# Patient Record
Sex: MALE | Race: WHITE | NOT HISPANIC OR LATINO | Employment: FULL TIME | URBAN - NONMETROPOLITAN AREA
[De-identification: names, ages, dates, MRNs, and addresses within clinical notes are randomized per-mention and may not be internally consistent; named-entity substitution may affect disease eponyms.]

---

## 2018-01-25 ENCOUNTER — CONSULT (OUTPATIENT)
Dept: GASTROENTEROLOGY | Facility: CLINIC | Age: 48
End: 2018-01-25

## 2018-01-25 VITALS
WEIGHT: 222.8 LBS | SYSTOLIC BLOOD PRESSURE: 121 MMHG | DIASTOLIC BLOOD PRESSURE: 79 MMHG | OXYGEN SATURATION: 97 % | BODY MASS INDEX: 33 KG/M2 | HEART RATE: 77 BPM | HEIGHT: 69 IN

## 2018-01-25 DIAGNOSIS — R12 HEARTBURN: ICD-10-CM

## 2018-01-25 DIAGNOSIS — R11.0 NAUSEA: ICD-10-CM

## 2018-01-25 DIAGNOSIS — R14.3 FLATULENCE: ICD-10-CM

## 2018-01-25 DIAGNOSIS — R10.30 LOWER ABDOMINAL PAIN: ICD-10-CM

## 2018-01-25 DIAGNOSIS — K21.9 GASTROESOPHAGEAL REFLUX DISEASE, ESOPHAGITIS PRESENCE NOT SPECIFIED: ICD-10-CM

## 2018-01-25 DIAGNOSIS — R14.0 ABDOMINAL DISTENTION: ICD-10-CM

## 2018-01-25 DIAGNOSIS — K62.5 ANAL BLEEDING: Primary | ICD-10-CM

## 2018-01-25 DIAGNOSIS — K59.00 CONSTIPATION, UNSPECIFIED CONSTIPATION TYPE: ICD-10-CM

## 2018-01-25 DIAGNOSIS — K64.9 HEMORRHOIDS, UNSPECIFIED HEMORRHOID TYPE: ICD-10-CM

## 2018-01-25 PROCEDURE — 99244 OFF/OP CNSLTJ NEW/EST MOD 40: CPT | Performed by: PHYSICIAN ASSISTANT

## 2018-01-25 RX ORDER — LISINOPRIL 10 MG/1
20 TABLET ORAL DAILY
COMMUNITY
End: 2021-09-23 | Stop reason: SDUPTHER

## 2018-01-25 RX ORDER — BUPROPION HYDROCHLORIDE 100 MG/1
100 TABLET, EXTENDED RELEASE ORAL 2 TIMES DAILY
COMMUNITY
End: 2021-09-23

## 2018-01-25 NOTE — PROGRESS NOTES
Chief Complaint   Patient presents with   • Abdominal Pain   • Constipation   • Nausea     Sterling Brady is a 47 y.o. male who presents to the office today at the request of JOSE Joyce for Abdominal Pain; Constipation; and Nausea.    HPI    The patient was seen for a GI evaluation due to lower abdominal pain/cramping, constipation, anal bleeding, and nausea that has been going on for the past two years.  Patient states that he also has increased flatulence and abdominal distention due to his constipation.  He has tried Metamucil, Miralax, over the counter stool softeners and prescription stool softeners in the past.  He takes Goody's powder for headaches.  He reports that he drinks 4-5 bottles of water per day and 2-3 caffeinated drinks per day.  Patient denies vomiting, diarrhea, and melena.  He reports a long history of hemorrhoids. The patient has never had a colonoscopy.  He had an EGD at age 18 and was placed on Zantac.  Patient has significant acid reflux and takes Zantac as needed which doesn't completely control it.  He still has his gallbladder.  Patient is unsure of family history.   Medical, surgical, and social histories were reviewed and are listed below.    Review of Systems   Constitutional: Positive for fatigue. Negative for chills and fever.   HENT: Positive for congestion. Negative for trouble swallowing.    Eyes: Positive for pain and visual disturbance.   Respiratory: Positive for shortness of breath. Negative for cough and wheezing.    Cardiovascular: Negative for chest pain.   Gastrointestinal: Positive for abdominal distention, anal bleeding, constipation and nausea. Negative for abdominal pain, blood in stool, diarrhea, rectal pain and vomiting.   Genitourinary: Negative for difficulty urinating.   Musculoskeletal: Positive for arthralgias, back pain and myalgias.   Skin: Negative for rash and wound.   Allergic/Immunologic: Positive for environmental allergies. Negative for food  "allergies.   Neurological: Negative for dizziness and headaches.   Hematological: Does not bruise/bleed easily.   Psychiatric/Behavioral: Positive for sleep disturbance. The patient is nervous/anxious.        ACTIVE PROBLEMS:   Specialty Problems     None          PAST MEDICAL HISTORY:  Past Medical History:   Diagnosis Date   • Hypertension        SURGICAL HISTORY:  History reviewed. No pertinent surgical history.    FAMILY HISTORY:  Family History   Problem Relation Age of Onset   • Hypertension Mother    • Heart disease Mother    • Diabetes Mother        SOCIAL HISTORY:  Social History   Substance Use Topics   • Smoking status: Current Some Day Smoker   • Smokeless tobacco: Never Used   • Alcohol use Yes       CURRENT MEDICATION:    Current Outpatient Prescriptions:   •  Aspirin-Acetaminophen-Caffeine (GOODY HEADACHE PO), Take  by mouth., Disp: , Rfl:   •  buPROPion SR (WELLBUTRIN SR) 100 MG 12 hr tablet, Take 100 mg by mouth 2 (Two) Times a Day., Disp: , Rfl:   •  lisinopril (PRINIVIL,ZESTRIL) 10 MG tablet, Take 10 mg by mouth Daily., Disp: , Rfl:   •  linaclotide (LINZESS) 72 MCG capsule capsule, Take 1 capsule by mouth Daily. Take 1 tablet once daily on an empty stomach at least 30 minutes prior to the first meal of the day., Disp: 30 capsule, Rfl: 5  •  polyethylene glycol (GoLYTELY) 236 g solution, Starting at 6 pm on day prior to procedure, drink 8 ounces every 15 minutes until all gone or stools are clear. May add flavor packet., Disp: 4000 mL, Rfl: 0    ALLERGIES:  Review of patient's allergies indicates no known allergies.    VISIT VITALS:  /79  Pulse 77  Ht 175.3 cm (69\")  Wt 101 kg (222 lb 12.8 oz)  SpO2 97%  BMI 32.9 kg/m2    PHYSICAL EXAMINATION:  Physical Exam   Constitutional: He is oriented to person, place, and time. He appears well-developed and well-nourished. No distress.   HENT:   Head: Normocephalic and atraumatic.   Right Ear: External ear normal.   Left Ear: External ear normal. "   Nose: Nose normal.   Mouth/Throat: Oropharynx is clear and moist. No oropharyngeal exudate.   Eyes: Conjunctivae and EOM are normal. Pupils are equal, round, and reactive to light. Right eye exhibits no discharge. Left eye exhibits no discharge. No scleral icterus.   Neck: Normal range of motion. Neck supple. No JVD present. No tracheal deviation present. No thyromegaly present.   Cardiovascular: Normal rate, regular rhythm, normal heart sounds and intact distal pulses.  Exam reveals no gallop and no friction rub.    No murmur heard.  Pulmonary/Chest: Effort normal and breath sounds normal. No stridor. No respiratory distress. He has no wheezes. He has no rales. He exhibits no tenderness.   Abdominal: Soft. Bowel sounds are normal. He exhibits no distension and no mass. There is no tenderness. There is no rebound and no guarding. No hernia.   Genitourinary: Rectal exam shows guaiac negative stool.   Lymphadenopathy:     He has no cervical adenopathy.   Neurological: He is alert and oriented to person, place, and time. He has normal reflexes. He displays normal reflexes. No cranial nerve deficit. He exhibits normal muscle tone. Coordination normal.   Skin: Skin is warm and dry. No rash noted. He is not diaphoretic. No erythema. No pallor.   Psychiatric: He has a normal mood and affect. His behavior is normal. Judgment and thought content normal.       Assessment/Plan      Diagnosis Plan   1. Anal bleeding  Case Request   2. Nausea  Case Request   3. Constipation, unspecified constipation type  Case Request   4. Lower abdominal pain  Case Request   5. Hemorrhoids, unspecified hemorrhoid type  Case Request   6. Abdominal distention  Case Request   7. Flatulence  Case Request   8. Heartburn  Case Request   9. Gastroesophageal reflux disease, esophagitis presence not specified  Case Request     The patient will be started on Linzess 72mcg for constipation.  He will also be scheduled for an EGD/colonoscopy due to anal  bleeding uncontrolled acid reflux, and other GI symptoms.  Procedures were discussed with the patient who voiced understanding and agreement.    Return in about 8 weeks (around 3/22/2018) for Recheck.           SUDHA Larson

## 2021-09-23 ENCOUNTER — OFFICE VISIT (OUTPATIENT)
Dept: INTERNAL MEDICINE | Facility: CLINIC | Age: 51
End: 2021-09-23

## 2021-09-23 VITALS
HEART RATE: 83 BPM | TEMPERATURE: 97.9 F | HEIGHT: 69 IN | OXYGEN SATURATION: 98 % | SYSTOLIC BLOOD PRESSURE: 158 MMHG | BODY MASS INDEX: 33.89 KG/M2 | DIASTOLIC BLOOD PRESSURE: 96 MMHG | WEIGHT: 228.8 LBS

## 2021-09-23 DIAGNOSIS — Z91.89 AT RISK FOR SLEEP APNEA: ICD-10-CM

## 2021-09-23 DIAGNOSIS — Z12.11 SCREEN FOR COLON CANCER: ICD-10-CM

## 2021-09-23 DIAGNOSIS — I10 ESSENTIAL HYPERTENSION: Primary | ICD-10-CM

## 2021-09-23 DIAGNOSIS — F41.1 GENERALIZED ANXIETY DISORDER: ICD-10-CM

## 2021-09-23 DIAGNOSIS — M25.50 ARTHRALGIA, UNSPECIFIED JOINT: ICD-10-CM

## 2021-09-23 DIAGNOSIS — K21.9 GASTROESOPHAGEAL REFLUX DISEASE WITHOUT ESOPHAGITIS: ICD-10-CM

## 2021-09-23 DIAGNOSIS — Z00.00 HEALTHCARE MAINTENANCE: ICD-10-CM

## 2021-09-23 DIAGNOSIS — Z12.5 SCREENING FOR PROSTATE CANCER: ICD-10-CM

## 2021-09-23 DIAGNOSIS — Z72.0 TOBACCO ABUSE: ICD-10-CM

## 2021-09-23 PROCEDURE — 99204 OFFICE O/P NEW MOD 45 MIN: CPT | Performed by: NURSE PRACTITIONER

## 2021-09-23 RX ORDER — BUPROPION HYDROCHLORIDE 100 MG/1
100 TABLET, EXTENDED RELEASE ORAL
Status: CANCELLED | OUTPATIENT
Start: 2021-09-23

## 2021-09-23 RX ORDER — NAPROXEN 375 MG/1
375 TABLET, DELAYED RELEASE ORAL DAILY PRN
Qty: 30 EACH | Refills: 3 | Status: SHIPPED | OUTPATIENT
Start: 2021-09-23

## 2021-09-23 RX ORDER — BUPROPION HYDROCHLORIDE 150 MG/1
150 TABLET ORAL EVERY MORNING
Qty: 30 TABLET | Refills: 11 | Status: SHIPPED | OUTPATIENT
Start: 2021-09-23 | End: 2021-09-23 | Stop reason: SDUPTHER

## 2021-09-23 RX ORDER — LISINOPRIL 10 MG/1
20 TABLET ORAL DAILY
Status: CANCELLED | OUTPATIENT
Start: 2021-09-23

## 2021-09-23 RX ORDER — FAMOTIDINE 20 MG/1
20 TABLET, FILM COATED ORAL 2 TIMES DAILY PRN
Qty: 60 TABLET | Refills: 5 | Status: SHIPPED | OUTPATIENT
Start: 2021-09-23 | End: 2022-03-23 | Stop reason: SDUPTHER

## 2021-09-23 RX ORDER — LISINOPRIL 20 MG/1
20 TABLET ORAL DAILY
Qty: 30 TABLET | Refills: 0 | Status: SHIPPED | OUTPATIENT
Start: 2021-09-23 | End: 2021-09-28 | Stop reason: SDUPTHER

## 2021-09-23 RX ORDER — BUPROPION HYDROCHLORIDE 300 MG/1
300 TABLET ORAL EVERY MORNING
Qty: 30 TABLET | Refills: 11 | Status: ON HOLD | OUTPATIENT
Start: 2021-09-23 | End: 2023-04-04

## 2021-09-23 NOTE — PROGRESS NOTES
Chief Complaint   Patient presents with   • Establish Care   • Hypertension   • Nicotine Dependence     wants to quit       History of Present Illness    51 y.o.male presents for establish care, htn, anxiety, tobacco abuse.    htn years; on lisinopril. No headaches, vision changes, chest pain or edema.  Anxiety chronic; takes wellbutrin for this. Would like to increase dose. Also Smoker pack a day. interested in cessation.  Gerd; takes ppi controlled.  Snoring; need sleep study.  Chronic arthralgias    Review of Systems   Constitutional: Negative for chills, fatigue, unexpected weight gain and unexpected weight loss.   HENT: Negative for congestion, postnasal drip and rhinorrhea.    Eyes: Negative for blurred vision and visual disturbance.   Respiratory: Negative for cough and shortness of breath.    Cardiovascular: Negative for chest pain, palpitations and leg swelling.   Gastrointestinal: Positive for GERD. Negative for abdominal pain, blood in stool, constipation, diarrhea, nausea and vomiting.   Genitourinary: Negative for difficulty urinating.   Musculoskeletal: Negative for arthralgias.   Skin: Negative for rash.   Neurological: Negative for dizziness, syncope, light-headedness and headache.   Psychiatric/Behavioral: Negative for depressed mood. The patient is nervous/anxious.          [facility]  The following portions of the patient's history were reviewed and updated as appropriate: allergies, current medications, past family history, past medical history, past social history, past surgical history and problem list.     Past Medical History:   Diagnosis Date   • Hypertension       No Known Allergies   Social History     Tobacco Use   • Smoking status: Current Some Day Smoker   • Smokeless tobacco: Never Used   Vaping Use   • Vaping Use: Never used   Substance Use Topics   • Alcohol use: Yes   • Drug use: Never     Past Surgical History:   Procedure Laterality Date   • WISDOM TOOTH EXTRACTION        Family  "History   Problem Relation Age of Onset   • Hypertension Mother    • Heart disease Mother    • Diabetes Mother    • No Known Problems Father    • No Known Problems Sister    • Alzheimer's disease Maternal Grandmother    • Cancer Paternal Grandfather            Current Outpatient Medications:   •  Aspirin-Acetaminophen-Caffeine (GOODY HEADACHE PO), Take  by mouth., Disp: , Rfl:   •  buPROPion SR (WELLBUTRIN SR) 100 MG 12 hr tablet, Take 100 mg by mouth 2 (Two) Times a Day., Disp: , Rfl:   •  linaclotide (LINZESS) 72 MCG capsule capsule, Take 1 capsule by mouth Daily. Take 1 tablet once daily on an empty stomach at least 30 minutes prior to the first meal of the day. (Patient taking differently: Take 72 mcg by mouth As Needed. Take 1 tablet once daily on an empty stomach at least 30 minutes prior to the first meal of the day.), Disp: 30 capsule, Rfl: 5  •  lisinopril (PRINIVIL,ZESTRIL) 10 MG tablet, Take 20 mg by mouth Daily., Disp: , Rfl:     VITALS:  /96   Pulse 83   Temp 97.9 °F (36.6 °C)   Ht 175.3 cm (69\")   Wt 104 kg (228 lb 12.8 oz)   SpO2 98%   BMI 33.79 kg/m²     Physical Exam  Vitals reviewed.   Constitutional:       General: He is not in acute distress.     Appearance: He is well-developed. He is not ill-appearing or diaphoretic.   HENT:      Head: Normocephalic.      Right Ear: Tympanic membrane, ear canal and external ear normal.      Left Ear: Tympanic membrane, ear canal and external ear normal.      Nose: Nose normal.      Mouth/Throat:      Mouth: Mucous membranes are moist.      Pharynx: Oropharynx is clear. No oropharyngeal exudate or posterior oropharyngeal erythema.   Eyes:      General: Lids are normal.         Right eye: No discharge.         Left eye: No discharge.      Extraocular Movements: Extraocular movements intact.      Conjunctiva/sclera: Conjunctivae normal.      Pupils: Pupils are equal, round, and reactive to light.   Neck:      Thyroid: No thyromegaly.      Vascular: No " JVD.   Cardiovascular:      Rate and Rhythm: Normal rate and regular rhythm.      Pulses: Normal pulses.      Heart sounds: Normal heart sounds.      Comments: No edema  Pulmonary:      Effort: Pulmonary effort is normal. No respiratory distress.      Breath sounds: Normal breath sounds.   Abdominal:      General: Bowel sounds are normal. There is no distension or abdominal bruit.      Palpations: Abdomen is soft. There is no hepatomegaly, splenomegaly or mass.      Tenderness: There is no abdominal tenderness. There is no right CVA tenderness or left CVA tenderness.      Hernia: No hernia is present.   Musculoskeletal:         General: Normal range of motion.      Cervical back: Normal range of motion and neck supple.      Comments: All major joints with normal ROM   Lymphadenopathy:      Head:      Right side of head: No submental, submandibular or tonsillar adenopathy.      Left side of head: No submental, submandibular or tonsillar adenopathy.      Cervical: No cervical adenopathy.   Skin:     General: Skin is warm and dry.      Capillary Refill: Capillary refill takes less than 2 seconds.      Findings: No rash.   Neurological:      General: No focal deficit present.      Mental Status: He is alert and oriented to person, place, and time.      Cranial Nerves: No cranial nerve deficit.      Coordination: Coordination normal.      Gait: Gait normal.   Psychiatric:         Mood and Affect: Mood normal.         Speech: Speech normal.         Behavior: Behavior normal.         Result Review :            Assessment and Plan    Diagnoses and all orders for this visit:    1. Essential hypertension (Primary)  -     MicroAlbumin, Urine, Random - Urine, Clean Catch; Future  -     Comprehensive Metabolic Panel; Future  -     Lipid Panel; Future  -     lisinopril (PRINIVIL,ZESTRIL) 20 MG tablet; Take 1 tablet by mouth Daily.  Dispense: 30 tablet; Refill: 5  Increased lisinopril.  DASH diet reviewed.  2. Screen for colon  cancer  -     Ambulatory Referral For Screening Colonoscopy    3. Healthcare maintenance  -     CBC & Differential; Future  -     Comprehensive Metabolic Panel; Future  -     Hemoglobin A1c; Future    4. Screening for prostate cancer  -     PSA SCREENING; Future    5. At risk for sleep apnea  -     Ambulatory Referral to Sleep Medicine    6. Tobacco abuse  -     buPROPion XL (Wellbutrin XL) 300 MG 24 hr tablet; Take 1 tablet by mouth Every Morning.  Dispense: 30 tablet; Refill: 11    7. Gastroesophageal reflux disease without esophagitis  -     famotidine (Pepcid) 20 MG tablet; Take 1 tablet by mouth 2 (Two) Times a Day As Needed for Heartburn.  Dispense: 60 tablet; Refill: 5    8. Arthralgia, unspecified joint  -     naproxen (EC NAPROSYN) 375 MG tablet delayed-release EC tablet; Take 1 tablet by mouth Daily As Needed for Mild Pain . Take with food  Dispense: 30 each; Refill: 3    9. Generalized anxiety disorder  -     buPROPion XL (Wellbutrin XL) 300 MG 24 hr tablet; Take 1 tablet by mouth Every Morning.  Dispense: 30 tablet; Refill: 11  Though this med is more for depression; he feels like helps his anxiety so will cont. Increased dose since also desiring smoking cessation.        I discussed the patients findings and my recommendations with patient.  Patient was encouraged to keep me informed of any acute changes, lack of improvement, or any new concerning symptoms.  Patient voiced understanding of all instructions and denied further questions.      Follow Up   Return in about 6 months (around 3/23/2022), or if symptoms worsen or fail to improve.      Electronically signed by:    JOSE Sherwood  09/23/2021

## 2021-09-23 NOTE — PATIENT INSTRUCTIONS
"https://www.nhlbi.nih.gov/files/docs/public/heart/dash_brief.pdf\">   DASH Eating Plan  DASH stands for Dietary Approaches to Stop Hypertension. The DASH eating plan is a healthy eating plan that has been shown to:  · Reduce high blood pressure (hypertension).  · Reduce your risk for type 2 diabetes, heart disease, and stroke.  · Help with weight loss.  What are tips for following this plan?  Reading food labels  · Check food labels for the amount of salt (sodium) per serving. Choose foods with less than 5 percent of the Daily Value of sodium. Generally, foods with less than 300 milligrams (mg) of sodium per serving fit into this eating plan.  · To find whole grains, look for the word \"whole\" as the first word in the ingredient list.  Shopping  · Buy products labeled as \"low-sodium\" or \"no salt added.\"  · Buy fresh foods. Avoid canned foods and pre-made or frozen meals.  Cooking  · Avoid adding salt when cooking. Use salt-free seasonings or herbs instead of table salt or sea salt. Check with your health care provider or pharmacist before using salt substitutes.  · Do not hernandez foods. Cook foods using healthy methods such as baking, boiling, grilling, roasting, and broiling instead.  · Cook with heart-healthy oils, such as olive, canola, avocado, soybean, or sunflower oil.  Meal planning    · Eat a balanced diet that includes:  ? 4 or more servings of fruits and 4 or more servings of vegetables each day. Try to fill one-half of your plate with fruits and vegetables.  ? 6-8 servings of whole grains each day.  ? Less than 6 oz (170 g) of lean meat, poultry, or fish each day. A 3-oz (85-g) serving of meat is about the same size as a deck of cards. One egg equals 1 oz (28 g).  ? 2-3 servings of low-fat dairy each day. One serving is 1 cup (237 mL).  ? 1 serving of nuts, seeds, or beans 5 times each week.  ? 2-3 servings of heart-healthy fats. Healthy fats called omega-3 fatty acids are found in foods such as walnuts, " flaxseeds, fortified milks, and eggs. These fats are also found in cold-water fish, such as sardines, salmon, and mackerel.  · Limit how much you eat of:  ? Canned or prepackaged foods.  ? Food that is high in trans fat, such as some fried foods.  ? Food that is high in saturated fat, such as fatty meat.  ? Desserts and other sweets, sugary drinks, and other foods with added sugar.  ? Full-fat dairy products.  · Do not salt foods before eating.  · Do not eat more than 4 egg yolks a week.  · Try to eat at least 2 vegetarian meals a week.  · Eat more home-cooked food and less restaurant, buffet, and fast food.    Lifestyle  · When eating at a restaurant, ask that your food be prepared with less salt or no salt, if possible.  · If you drink alcohol:  ? Limit how much you use to:  § 0-1 drink a day for women who are not pregnant.  § 0-2 drinks a day for men.  ? Be aware of how much alcohol is in your drink. In the [place] one drink equals one 12 oz bottle of beer (355 mL), one 5 oz glass of wine (148 mL), or one 1½ oz glass of hard liquor (44 mL).  General information  · Avoid eating more than 2,300 mg of salt a day. If you have hypertension, you may need to reduce your sodium intake to 1,500 mg a day.  · Work with your health care provider to maintain a healthy body weight or to lose weight. Ask what an ideal weight is for you.  · Get at least 30 minutes of exercise that causes your heart to beat faster (aerobic exercise) most days of the week. Activities may include walking, swimming, or biking.  · Work with your health care provider or dietitian to adjust your eating plan to your individual calorie needs.  What foods should I eat?  Fruits  All fresh, dried, or frozen fruit. Canned fruit in natural juice (without added sugar).  Vegetables  Fresh or frozen vegetables (raw, steamed, roasted, or grilled). Low-sodium or reduced-sodium tomato and vegetable juice. Low-sodium or reduced-sodium tomato sauce and tomato paste.  Low-sodium or reduced-sodium canned vegetables.  Grains  Whole-grain or whole-wheat bread. Whole-grain or whole-wheat pasta. Brown rice. Oatmeal. Quinoa. Bulgur. Whole-grain and low-sodium cereals. Carmen bread. Low-fat, low-sodium crackers. Whole-wheat flour tortillas.  Meats and other proteins  Skinless chicken or turkey. Ground chicken or turkey. Pork with fat trimmed off. Fish and seafood. Egg whites. Dried beans, peas, or lentils. Unsalted nuts, nut butters, and seeds. Unsalted canned beans. Lean cuts of beef with fat trimmed off. Low-sodium, lean precooked or cured meat, such as sausages or meat loaves.  Dairy  Low-fat (1%) or fat-free (skim) milk. Reduced-fat, low-fat, or fat-free cheeses. Nonfat, low-sodium ricotta or cottage cheese. Low-fat or nonfat yogurt. Low-fat, low-sodium cheese.  Fats and oils  Soft margarine without trans fats. Vegetable oil. Reduced-fat, low-fat, or light mayonnaise and salad dressings (reduced-sodium). Canola, safflower, olive, avocado, soybean, and sunflower oils. Avocado.  Seasonings and condiments  Herbs. Spices. Seasoning mixes without salt.  Other foods  Unsalted popcorn and pretzels. Fat-free sweets.  The items listed above may not be a complete list of foods and beverages you can eat. Contact a dietitian for more information.  What foods should I avoid?  Fruits  Canned fruit in a light or heavy syrup. Fried fruit. Fruit in cream or butter sauce.  Vegetables  Creamed or fried vegetables. Vegetables in a cheese sauce. Regular canned vegetables (not low-sodium or reduced-sodium). Regular canned tomato sauce and paste (not low-sodium or reduced-sodium). Regular tomato and vegetable juice (not low-sodium or reduced-sodium). Pickles. Olives.  Grains  Baked goods made with fat, such as croissants, muffins, or some breads. Dry pasta or rice meal packs.  Meats and other proteins  Fatty cuts of meat. Ribs. Fried meat. Melara. Bologna, salami, and other precooked or cured meats, such as  sausages or meat loaves. Fat from the back of a pig (fatback). Bratwurst. Salted nuts and seeds. Canned beans with added salt. Canned or smoked fish. Whole eggs or egg yolks. Chicken or turkey with skin.  Dairy  Whole or 2% milk, cream, and half-and-half. Whole or full-fat cream cheese. Whole-fat or sweetened yogurt. Full-fat cheese. Nondairy creamers. Whipped toppings. Processed cheese and cheese spreads.  Fats and oils  Butter. Stick margarine. Lard. Shortening. Ghee. Melara fat. Tropical oils, such as coconut, palm kernel, or palm oil.  Seasonings and condiments  Onion salt, garlic salt, seasoned salt, table salt, and sea salt. Worcestershire sauce. Tartar sauce. Barbecue sauce. Teriyaki sauce. Soy sauce, including reduced-sodium. Steak sauce. Canned and packaged gravies. Fish sauce. Oyster sauce. Cocktail sauce. Store-bought horseradish. Ketchup. Mustard. Meat flavorings and tenderizers. Bouillon cubes. Hot sauces. Pre-made or packaged marinades. Pre-made or packaged taco seasonings. Relishes. Regular salad dressings.  Other foods  Salted popcorn and pretzels.  The items listed above may not be a complete list of foods and beverages you should avoid. Contact a dietitian for more information.  Where to find more information  · National Heart, Lung, and Blood [place]: www.nhlbi.nih.gov  · American Heart Association: www.heart.org  · Academy of Nutrition and Dietetics: www.eatright.org  · National Kidney Foundation: www.kidney.org  Summary  · The DASH eating plan is a healthy eating plan that has been shown to reduce high blood pressure (hypertension). It may also reduce your risk for type 2 diabetes, heart disease, and stroke.  · When on the DASH eating plan, aim to eat more fresh fruits and vegetables, whole grains, lean proteins, low-fat dairy, and heart-healthy fats.  · With the DASH eating plan, you should limit salt (sodium) intake to 2,300 mg a day. If you have hypertension, you may need to reduce your  sodium intake to 1,500 mg a day.  · Work with your health care provider or dietitian to adjust your eating plan to your individual calorie needs.  This information is not intended to replace advice given to you by your health care provider. Make sure you discuss any questions you have with your health care provider.  Document Revised: 11/20/2020 Document Reviewed: 11/20/2020  [employer] Patient Education © 2021 [employer]

## 2021-09-28 RX ORDER — LISINOPRIL 20 MG/1
20 TABLET ORAL DAILY
Qty: 30 TABLET | Refills: 5 | Status: SHIPPED | OUTPATIENT
Start: 2021-09-28 | End: 2022-03-23 | Stop reason: SDUPTHER

## 2021-10-15 DIAGNOSIS — I10 ESSENTIAL HYPERTENSION: ICD-10-CM

## 2021-10-15 RX ORDER — LISINOPRIL 20 MG/1
TABLET ORAL
Qty: 30 TABLET | Refills: 5 | OUTPATIENT
Start: 2021-10-15

## 2022-01-04 ENCOUNTER — OFFICE VISIT (OUTPATIENT)
Dept: INTERNAL MEDICINE | Facility: CLINIC | Age: 52
End: 2022-01-04

## 2022-01-04 DIAGNOSIS — J01.00 ACUTE NON-RECURRENT MAXILLARY SINUSITIS: ICD-10-CM

## 2022-01-04 DIAGNOSIS — U07.1 COVID-19 VIRUS INFECTION: Primary | ICD-10-CM

## 2022-01-04 PROCEDURE — 99441 PR PHYS/QHP TELEPHONE EVALUATION 5-10 MIN: CPT | Performed by: STUDENT IN AN ORGANIZED HEALTH CARE EDUCATION/TRAINING PROGRAM

## 2022-01-04 RX ORDER — AMOXICILLIN AND CLAVULANATE POTASSIUM 875; 125 MG/1; MG/1
1 TABLET, FILM COATED ORAL 2 TIMES DAILY
Qty: 10 TABLET | Refills: 0 | Status: SHIPPED | OUTPATIENT
Start: 2022-01-04 | End: 2022-03-23

## 2022-01-04 NOTE — PROGRESS NOTES
Telehealth Visit      Patient Name: Sterling Brady  : 1970   MRN: [id number]     Sterling Brady is a 51 y.o. male who is presenting to [facility] for assessment with Dr. Cori Kaye MD. The patient is seen remotely using [facility] My Chart. Patient is being seen via telehealth and stated they are in a secure environment for this session. The patient's condition being diagnosed/treated is appropriate for telemedicine. The provider identified herself as well as her credentials. The patient consents to be seen remotely. They may refuse to be seen remotely at any time. The electronic data is encrypted and password protected, and the patient has been advised of the potential risks to privacy not withstanding such measures.    Chief Complaint:    Chief Complaint   Patient presents with   • Covid-19 Home Monitoring Phone Call     Quarantine is over on 21   • Nasal Congestion   • Headache       History of Present Illness: Sterling Brady presents to discuss continued congestion despite other COVID symptoms improving.  He notes severe congestion and ear fullness. He has associated headache and thick green mucus. He is cleared to return to work on 2022 and is concerned that these symptoms seem to be worsening and not improving. He is using OTC mucinex and NSAIDs without improvement.     Subjective     I have reviewed and the following portions of the patient's history were updated as appropriate: past family history, past medical history, past social history, past surgical history and problem list.    Allergies:   No Known Allergies    Objective     Physical Exam:  Vital Signs:   Vitals:    [id number]   PainSc: 0-No pain     There is no height or weight on file to calculate BMI.    Unable to complete physical exam due to the appointment being over the telephone. He is able to complete full sentences without sounding short of breath and without stopping to catch his  breath.     Assessment / Plan      Assessment/Plan:   Diagnoses and all orders for this visit:    1. COVID-19 virus infection (Primary)  Continue symptomatic management. Tylenol is preferred for fever, muscle aches, and headache but if it does not work, ibuprofen, motrin, or aleve can be used. Stay hydrated. Use over the counter cough suppressants for cough. Move around as much as you are able to and reposition frequently. Rest as much as you need to but advance the amount of activity you are doing as able. If you have trouble breathing, persistent chest pain or pressure, new confusion, inability to wake or stay awake, or pale grey or blue-colored skin, lips, or nail beds, you should be evaluated at the ED.      2. Acute non-recurrent maxillary sinusitis  COVID symptoms were improving then acutely worsened with congestion, ear fullness, headache, and thick green mucus. It's possible that he now has secondary bacterial sinusitis. Will treat with Augmentin 875 mg BID for 5 days. Also discussed with the patient that this could still be related to COVID that needs to run it's course. Recommended OTC flonase for symptomatic management.     Follow Up:   Return for Next scheduled follow up.    I spent approximately 8 minutes providing clinical care for this patient; including review of patient's chart and provider documentation, discussing the patients care including taking history and formulating a treatment plan, placing orders, and completing patient documentation. Telephone was used to complete this appointment. The patient's identity was confirmed by using two demographic identifiers, full name and date of birth.  The patient confirmed their current location in [place], and this provider was located in [place], [place].     Unable to complete visit using a video connection to the patient. A phone visit was used to complete this visits. Total time of discussion was 5 minutes.     Joycelyn Kaye MD  [facility]  [place]

## 2022-01-19 ENCOUNTER — LAB (OUTPATIENT)
Dept: LAB | Facility: HOSPITAL | Age: 52
End: 2022-01-19

## 2022-01-19 ENCOUNTER — CLINICAL SUPPORT (OUTPATIENT)
Dept: INTERNAL MEDICINE | Facility: CLINIC | Age: 52
End: 2022-01-19

## 2022-01-19 DIAGNOSIS — I10 ESSENTIAL HYPERTENSION: ICD-10-CM

## 2022-01-19 DIAGNOSIS — Z12.5 SCREENING FOR PROSTATE CANCER: ICD-10-CM

## 2022-01-19 DIAGNOSIS — Z00.00 HEALTHCARE MAINTENANCE: ICD-10-CM

## 2022-01-19 DIAGNOSIS — Z11.52 ENCOUNTER FOR SCREENING FOR COVID-19: Primary | ICD-10-CM

## 2022-01-19 LAB
ALBUMIN SERPL-MCNC: 4.6 G/DL (ref 3.5–5.2)
ALBUMIN UR-MCNC: <1.2 MG/DL
ALBUMIN/GLOB SERPL: 2.3 G/DL
ALP SERPL-CCNC: 54 U/L (ref 39–117)
ALT SERPL W P-5'-P-CCNC: 23 U/L (ref 1–41)
ANION GAP SERPL CALCULATED.3IONS-SCNC: 12 MMOL/L (ref 5–15)
AST SERPL-CCNC: 16 U/L (ref 1–40)
BASOPHILS # BLD AUTO: 0.12 10*3/MM3 (ref 0–0.2)
BASOPHILS NFR BLD AUTO: 1.8 % (ref 0–1.5)
BILIRUB SERPL-MCNC: 0.4 MG/DL (ref 0–1.2)
BUN SERPL-MCNC: 11 MG/DL (ref 6–20)
BUN/CREAT SERPL: 12.8 (ref 7–25)
CALCIUM SPEC-SCNC: 9.2 MG/DL (ref 8.6–10.5)
CHLORIDE SERPL-SCNC: 102 MMOL/L (ref 98–107)
CHOLEST SERPL-MCNC: 154 MG/DL (ref 0–200)
CO2 SERPL-SCNC: 24 MMOL/L (ref 22–29)
CREAT SERPL-MCNC: 0.86 MG/DL (ref 0.76–1.27)
DEPRECATED RDW RBC AUTO: 39.1 FL (ref 37–54)
EOSINOPHIL # BLD AUTO: 0.18 10*3/MM3 (ref 0–0.4)
EOSINOPHIL NFR BLD AUTO: 2.6 % (ref 0.3–6.2)
ERYTHROCYTE [DISTWIDTH] IN BLOOD BY AUTOMATED COUNT: 12.5 % (ref 12.3–15.4)
GFR SERPL CREATININE-BSD FRML MDRD: 95 ML/MIN/1.73
GLOBULIN UR ELPH-MCNC: 2 GM/DL
GLUCOSE SERPL-MCNC: 121 MG/DL (ref 65–99)
HBA1C MFR BLD: 6.2 % (ref 4.8–5.6)
HCT VFR BLD AUTO: 46 % (ref 37.5–51)
HDLC SERPL-MCNC: 38 MG/DL (ref 40–60)
HGB BLD-MCNC: 15.9 G/DL (ref 13–17.7)
IMM GRANULOCYTES # BLD AUTO: 0.02 10*3/MM3 (ref 0–0.05)
IMM GRANULOCYTES NFR BLD AUTO: 0.3 % (ref 0–0.5)
LDLC SERPL CALC-MCNC: 100 MG/DL (ref 0–100)
LDLC/HDLC SERPL: 2.62 {RATIO}
LYMPHOCYTES # BLD AUTO: 2.36 10*3/MM3 (ref 0.7–3.1)
LYMPHOCYTES NFR BLD AUTO: 34.5 % (ref 19.6–45.3)
MCH RBC QN AUTO: 30.2 PG (ref 26.6–33)
MCHC RBC AUTO-ENTMCNC: 34.6 G/DL (ref 31.5–35.7)
MCV RBC AUTO: 87.3 FL (ref 79–97)
MONOCYTES # BLD AUTO: 0.6 10*3/MM3 (ref 0.1–0.9)
MONOCYTES NFR BLD AUTO: 8.8 % (ref 5–12)
NEUTROPHILS NFR BLD AUTO: 3.56 10*3/MM3 (ref 1.7–7)
NEUTROPHILS NFR BLD AUTO: 52 % (ref 42.7–76)
NRBC BLD AUTO-RTO: 0 /100 WBC (ref 0–0.2)
PLATELET # BLD AUTO: 263 10*3/MM3 (ref 140–450)
PMV BLD AUTO: 10.9 FL (ref 6–12)
POTASSIUM SERPL-SCNC: 4.4 MMOL/L (ref 3.5–5.2)
PROT SERPL-MCNC: 6.6 G/DL (ref 6–8.5)
PSA SERPL-MCNC: 1.08 NG/ML (ref 0–4)
RBC # BLD AUTO: 5.27 10*6/MM3 (ref 4.14–5.8)
SARS-COV-2 RNA NOSE QL NAA+PROBE: NOT DETECTED
SODIUM SERPL-SCNC: 138 MMOL/L (ref 136–145)
TRIGL SERPL-MCNC: 82 MG/DL (ref 0–150)
VLDLC SERPL-MCNC: 16 MG/DL (ref 5–40)
WBC NRBC COR # BLD: 6.84 10*3/MM3 (ref 3.4–10.8)

## 2022-01-19 PROCEDURE — G0103 PSA SCREENING: HCPCS

## 2022-01-19 PROCEDURE — 80053 COMPREHEN METABOLIC PANEL: CPT

## 2022-01-19 PROCEDURE — 82043 UR ALBUMIN QUANTITATIVE: CPT

## 2022-01-19 PROCEDURE — U0004 COV-19 TEST NON-CDC HGH THRU: HCPCS | Performed by: NURSE PRACTITIONER

## 2022-01-19 PROCEDURE — 83036 HEMOGLOBIN GLYCOSYLATED A1C: CPT

## 2022-01-19 PROCEDURE — C9803 HOPD COVID-19 SPEC COLLECT: HCPCS

## 2022-01-19 PROCEDURE — 85025 COMPLETE CBC W/AUTO DIFF WBC: CPT

## 2022-01-19 PROCEDURE — 80061 LIPID PANEL: CPT

## 2022-01-21 NOTE — PROGRESS NOTES
Lab review:  negative prostate lab. Electrolytes liver and kidney function ok. Glucose slightly elevated A1c in prediabetes range. Cholesterol ok; good cholesterol could be higher. No anemia.   Increase your fresh fruits vegetables; increase exercise.

## 2022-02-04 ENCOUNTER — TELEPHONE (OUTPATIENT)
Dept: INTERNAL MEDICINE | Facility: CLINIC | Age: 52
End: 2022-02-04

## 2022-02-04 NOTE — TELEPHONE ENCOUNTER
Called and relayed the following message from [facility]:  Lab review:  negative prostate lab. Electrolytes liver and kidney function ok. Glucose slightly elevated A1c in prediabetes range. Cholesterol ok; good cholesterol could be higher. No anemia.   Increase your fresh fruits vegetables; increase exercise.  Pt verbalized his understanding.

## 2022-02-04 NOTE — TELEPHONE ENCOUNTER
PT CALLED TO GET LAB RESULTS FROM 01/19/2022.  MA AND TRAMAINE WERE IN WITH PT'S.  PT WOULD LIKE A PHONE CALL BACK TO GO OVER LAB RESULTS.

## 2022-03-23 ENCOUNTER — OFFICE VISIT (OUTPATIENT)
Dept: INTERNAL MEDICINE | Facility: CLINIC | Age: 52
End: 2022-03-23

## 2022-03-23 VITALS
WEIGHT: 234 LBS | HEART RATE: 94 BPM | OXYGEN SATURATION: 97 % | DIASTOLIC BLOOD PRESSURE: 88 MMHG | SYSTOLIC BLOOD PRESSURE: 150 MMHG | TEMPERATURE: 97.7 F | BODY MASS INDEX: 34.56 KG/M2

## 2022-03-23 DIAGNOSIS — I10 ESSENTIAL HYPERTENSION: ICD-10-CM

## 2022-03-23 DIAGNOSIS — H66.003 NON-RECURRENT ACUTE SUPPURATIVE OTITIS MEDIA OF BOTH EARS WITHOUT SPONTANEOUS RUPTURE OF TYMPANIC MEMBRANES: ICD-10-CM

## 2022-03-23 DIAGNOSIS — F41.1 GENERALIZED ANXIETY DISORDER: ICD-10-CM

## 2022-03-23 DIAGNOSIS — R53.83 FATIGUE, UNSPECIFIED TYPE: Primary | ICD-10-CM

## 2022-03-23 DIAGNOSIS — K21.9 GASTROESOPHAGEAL REFLUX DISEASE WITHOUT ESOPHAGITIS: ICD-10-CM

## 2022-03-23 PROCEDURE — 99214 OFFICE O/P EST MOD 30 MIN: CPT | Performed by: NURSE PRACTITIONER

## 2022-03-23 RX ORDER — CYCLOBENZAPRINE HCL 10 MG
TABLET ORAL
Qty: 30 TABLET | Refills: 2 | Status: SHIPPED | OUTPATIENT
Start: 2022-03-23 | End: 2022-06-23

## 2022-03-23 RX ORDER — FAMOTIDINE 20 MG/1
20 TABLET, FILM COATED ORAL 2 TIMES DAILY PRN
Qty: 180 TABLET | Refills: 3 | Status: SHIPPED | OUTPATIENT
Start: 2022-03-23

## 2022-03-23 RX ORDER — LISINOPRIL 30 MG/1
30 TABLET ORAL DAILY
Qty: 90 TABLET | Refills: 1 | Status: SHIPPED | OUTPATIENT
Start: 2022-03-23 | End: 2023-02-14

## 2022-03-23 RX ORDER — FLUOXETINE 10 MG/1
10 CAPSULE ORAL DAILY
Qty: 30 CAPSULE | Refills: 1 | Status: SHIPPED | OUTPATIENT
Start: 2022-03-23 | End: 2022-05-19 | Stop reason: SDUPTHER

## 2022-03-23 RX ORDER — AMOXICILLIN 875 MG/1
875 TABLET, COATED ORAL EVERY 12 HOURS SCHEDULED
Qty: 14 TABLET | Refills: 0 | Status: SHIPPED | OUTPATIENT
Start: 2022-03-23 | End: 2022-03-30

## 2022-03-23 NOTE — PROGRESS NOTES
Chief Complaint   Patient presents with   • Hypertension     Follow up   • Heartburn   • Anxiety       History of Present Illness    51 y.o.male presents for htn, gerd, anxiety follow up.  htn chronic onset years. On lisinopril controlled.  gerd controlled on pepcid.   Anxiety controlled on prozac  C/o fatigue  C/o ear pain; no congestion. Onset few days. No fever.    Review of Systems   Constitutional: Positive for fatigue. Negative for chills, fever, unexpected weight gain and unexpected weight loss.   HENT: Positive for ear pain. Negative for congestion, rhinorrhea, sinus pressure, sneezing, sore throat and swollen glands.    Eyes: Negative for blurred vision and visual disturbance.   Respiratory: Negative for cough and shortness of breath.    Cardiovascular: Negative for chest pain, palpitations and leg swelling.   Genitourinary: Negative for difficulty urinating.   Neurological: Negative for dizziness, syncope, light-headedness and headache.       [facility]  The following portions of the patient's history were reviewed and updated as appropriate: allergies, current medications, past family history, past medical history, past social history, past surgical history and problem list.     Past Medical History:   Diagnosis Date   • Hypertension       No Known Allergies   Social History     Tobacco Use   • Smoking status: Current Some Day Smoker   • Smokeless tobacco: Never Used   Vaping Use   • Vaping Use: Never used   Substance Use Topics   • Alcohol use: Yes   • Drug use: Never     Past Surgical History:   Procedure Laterality Date   • WISDOM TOOTH EXTRACTION        Family History   Problem Relation Age of Onset   • Hypertension Mother    • Heart disease Mother    • Diabetes Mother    • No Known Problems Father    • No Known Problems Sister    • Alzheimer's disease Maternal Grandmother    • Cancer Paternal Grandfather            Current Outpatient Medications:   •  Aspirin-Acetaminophen-Caffeine (GOODY HEADACHE PO),  Take  by mouth., Disp: , Rfl:   •  buPROPion XL (Wellbutrin XL) 300 MG 24 hr tablet, Take 1 tablet by mouth Every Morning., Disp: 30 tablet, Rfl: 11  •  famotidine (Pepcid) 20 MG tablet, Take 1 tablet by mouth 2 (Two) Times a Day As Needed for Heartburn., Disp: 60 tablet, Rfl: 5  •  linaclotide (LINZESS) 72 MCG capsule capsule, Take 1 capsule by mouth Daily. Take 1 tablet once daily on an empty stomach at least 30 minutes prior to the first meal of the day. (Patient taking differently: Take 72 mcg by mouth As Needed. Take 1 tablet once daily on an empty stomach at least 30 minutes prior to the first meal of the day.), Disp: 30 capsule, Rfl: 5  •  lisinopril (PRINIVIL,ZESTRIL) 20 MG tablet, Take 1 tablet by mouth Daily., Disp: 30 tablet, Rfl: 5  •  naproxen (EC NAPROSYN) 375 MG tablet delayed-release EC tablet, Take 1 tablet by mouth Daily As Needed for Mild Pain . Take with food, Disp: 30 each, Rfl: 3    VITALS:  /88   Pulse 94   Temp 97.7 °F (36.5 °C)   Wt 106 kg (234 lb)   SpO2 97%   BMI 34.56 kg/m²     Physical Exam  Vitals reviewed.   HENT:      Head: Normocephalic.      Right Ear: Ear canal and external ear normal. No middle ear effusion. Tympanic membrane is erythematous. Tympanic membrane is not injected or bulging.      Left Ear: Ear canal and external ear normal.  No middle ear effusion. Tympanic membrane is erythematous. Tympanic membrane is not injected or bulging.      Mouth/Throat:      Mouth: Mucous membranes are moist.   Cardiovascular:      Rate and Rhythm: Normal rate and regular rhythm.      Heart sounds: Normal heart sounds.   Pulmonary:      Effort: Pulmonary effort is normal. No respiratory distress.      Breath sounds: Normal breath sounds.   Neurological:      General: No focal deficit present.      Mental Status: He is alert and oriented to person, place, and time.         Result Review :            Assessment and Plan    Diagnoses and all orders for this visit:    1. Fatigue,  unspecified type (Primary)  -     Vitamin D 25 Hydroxy; Future  -     Vitamin B12; Future    2. Essential hypertension  -     lisinopril (PRINIVIL,ZESTRIL) 30 MG tablet; Take 1 tablet by mouth Daily.  Dispense: 90 tablet; Refill: 1    3. Gastroesophageal reflux disease without esophagitis  -     famotidine (Pepcid) 20 MG tablet; Take 1 tablet by mouth 2 (Two) Times a Day As Needed for Heartburn.  Dispense: 180 tablet; Refill: 3    4. Generalized anxiety disorder  -     FLUoxetine (PROzac) 10 MG capsule; Take 1 capsule by mouth Daily.  Dispense: 30 capsule; Refill: 1    5. Non-recurrent acute suppurative otitis media of both ears without spontaneous rupture of tympanic membranes  -     amoxicillin (AMOXIL) 875 MG tablet; Take 1 tablet by mouth Every 12 (Twelve) Hours for 7 days.  Dispense: 14 tablet; Refill: 0    Other orders  -     cyclobenzaprine (FLEXERIL) 10 MG tablet; Take half tab or up to a whole tab nightly as needed for neck back pain.  Dispense: 30 tablet; Refill: 2        I discussed the patients findings and my recommendations with patient.  Patient was encouraged to keep me informed of any acute changes, lack of improvement, or any new concerning symptoms.  Patient voiced understanding of all instructions and denied further questions.      Follow Up   Return in about 6 months (around 9/23/2022), or if symptoms worsen or fail to improve, for Recheck.      Electronically signed by:    JOSE Sherwood  03/23/2022

## 2022-05-19 DIAGNOSIS — F41.1 GENERALIZED ANXIETY DISORDER: ICD-10-CM

## 2022-05-19 RX ORDER — FLUOXETINE 10 MG/1
10 CAPSULE ORAL DAILY
Qty: 30 CAPSULE | Refills: 2 | Status: ON HOLD | OUTPATIENT
Start: 2022-05-19 | End: 2023-04-04

## 2022-06-23 RX ORDER — CYCLOBENZAPRINE HCL 10 MG
TABLET ORAL
Qty: 30 TABLET | Refills: 2 | Status: ON HOLD | OUTPATIENT
Start: 2022-06-23 | End: 2023-04-04

## 2023-02-14 ENCOUNTER — HOSPITAL ENCOUNTER (OUTPATIENT)
Dept: GENERAL RADIOLOGY | Facility: HOSPITAL | Age: 53
Discharge: HOME | End: 2023-02-14
Admitting: NURSE PRACTITIONER
Payer: COMMERCIAL

## 2023-02-14 ENCOUNTER — OFFICE VISIT (OUTPATIENT)
Dept: CARDIOLOGY | Facility: CLINIC | Age: 53
End: 2023-02-14
Payer: COMMERCIAL

## 2023-02-14 VITALS
WEIGHT: 229.2 LBS | BODY MASS INDEX: 33.95 KG/M2 | HEIGHT: 69 IN | HEART RATE: 60 BPM | DIASTOLIC BLOOD PRESSURE: 81 MMHG | SYSTOLIC BLOOD PRESSURE: 126 MMHG | OXYGEN SATURATION: 98 %

## 2023-02-14 DIAGNOSIS — Z91.89 MULTIPLE RISK FACTORS FOR CORONARY ARTERY DISEASE: ICD-10-CM

## 2023-02-14 DIAGNOSIS — R06.00 DYSPNEA, UNSPECIFIED TYPE: ICD-10-CM

## 2023-02-14 DIAGNOSIS — F17.201 TOBACCO USE DISORDER, MILD, IN EARLY REMISSION: ICD-10-CM

## 2023-02-14 DIAGNOSIS — E78.5 HYPERLIPIDEMIA, UNSPECIFIED HYPERLIPIDEMIA TYPE: ICD-10-CM

## 2023-02-14 DIAGNOSIS — R73.03 PREDIABETES: ICD-10-CM

## 2023-02-14 DIAGNOSIS — R07.9 CHEST PAIN, UNSPECIFIED TYPE: Primary | ICD-10-CM

## 2023-02-14 DIAGNOSIS — I10 PRIMARY HYPERTENSION: ICD-10-CM

## 2023-02-14 PROCEDURE — 93000 ELECTROCARDIOGRAM COMPLETE: CPT | Performed by: NURSE PRACTITIONER

## 2023-02-14 PROCEDURE — 99214 OFFICE O/P EST MOD 30 MIN: CPT | Performed by: NURSE PRACTITIONER

## 2023-02-14 PROCEDURE — 71046 X-RAY EXAM CHEST 2 VIEWS: CPT

## 2023-02-14 PROCEDURE — 71046 X-RAY EXAM CHEST 2 VIEWS: CPT | Performed by: RADIOLOGY

## 2023-02-14 RX ORDER — ATORVASTATIN CALCIUM 20 MG/1
20 TABLET, FILM COATED ORAL DAILY
COMMUNITY
Start: 2023-02-01

## 2023-02-14 RX ORDER — FLUOXETINE HYDROCHLORIDE 40 MG/1
40 CAPSULE ORAL DAILY
COMMUNITY
Start: 2023-02-03

## 2023-02-14 RX ORDER — FLUOXETINE HYDROCHLORIDE 20 MG/1
20 CAPSULE ORAL DAILY
COMMUNITY
Start: 2023-02-01

## 2023-02-14 RX ORDER — LISINOPRIL 40 MG/1
40 TABLET ORAL DAILY
COMMUNITY
Start: 2023-02-01

## 2023-02-14 NOTE — PROGRESS NOTES
Subjective     Sterling Brady is a 52 y.o. male.   Chief Complaint   Patient presents with   • Med Management     Reviewed medications with patient. Patient is tolerating medications well.    • Chest Pain     Patient describes this pain as sharp and stabbing.    • Shortness of Breath     With exertion   • Establish Care     History of Present Illness   Wil Brady is a 52-year-old male who presents to the clinic today as a new patient for cardiology evaluation.    He was referred by his PCP for further evaluation of chest discomfort.  He states symptoms have been going on intermittently and felt it was time to maybe have it checked.  He describes the discomfort as left-sided pains that is generally sharp and short-lived.  The pain does not radiate.  He occasionally gets mild dyspnea with the symptoms however denies significant diaphoresis or nausea.  He notices the pain mostly with activity and is very active with his upper extremities and feels it may be musculoskeletal at times.  He denies any palpitations or arrhythmia.  He has had no previous work-up.  Family history significant for mother with heart failure cardiomyopathy and MI.    Hypertension currently well controlled on lisinopril 40 mg daily at home.  He tries to adhere to low-sodium dietary intake.  Intermittent home monitoring monitoring with acceptable readings.  He states his blood pressure has improved since quitting smoking about a week ago.  Prediabetes had been on Ozempic and plans to restart however is currently not taking.  Hyperlipidemia recently started on Lipitor about a week ago, tolerating it well denies any medication side effects.  He had labs recently at PCP office however no results are available for review.    Patient Active Problem List   Diagnosis   • Nausea   • Lower abdominal pain   • Constipation   • Hemorrhoids   • Flatulence   • Abdominal distention   • Anal bleeding   • Heartburn   • Gastroesophageal reflux disease   •  Hyperlipidemia   • Primary hypertension   • Prediabetes   • Multiple risk factors for coronary artery disease   • Tobacco use disorder, mild, in early remission     Past Medical History:   Diagnosis Date   • Hypertension      Past Surgical History:   Procedure Laterality Date   • WISDOM TOOTH EXTRACTION       Family History   Problem Relation Age of Onset   • Hypertension Mother    • Heart disease Mother    • Diabetes Mother    • No Known Problems Father    • No Known Problems Sister    • Alzheimer's disease Maternal Grandmother    • Cancer Paternal Grandfather      Social History     Tobacco Use   • Smoking status: Former     Types: Cigarettes     Quit date: 2023     Years since quittin.0   • Smokeless tobacco: Never   Vaping Use   • Vaping Use: Never used   Substance Use Topics   • Alcohol use: Not Currently   • Drug use: Never     The following portions of the patient's history were reviewed and updated as appropriate: allergies, current medications, past family history, past medical history, past social history, past surgical history and problem list.    No Known Allergies      Current Outpatient Medications:   •  atorvastatin (LIPITOR) 20 MG tablet, Take 20 mg by mouth Daily., Disp: , Rfl:   •  cyclobenzaprine (FLEXERIL) 10 MG tablet, TAKE 1/2 TO 1 TAB BY MOUTH NIGHTLY AS NEEDED FOR NECK/BACK PAIN, Disp: 30 tablet, Rfl: 2  •  famotidine (Pepcid) 20 MG tablet, Take 1 tablet by mouth 2 (Two) Times a Day As Needed for Heartburn., Disp: 180 tablet, Rfl: 3  •  FLUoxetine (PROzac) 10 MG capsule, Take 1 capsule by mouth Daily., Disp: 30 capsule, Rfl: 2  •  FLUoxetine (PROzac) 20 MG capsule, Take 20 mg by mouth Daily., Disp: , Rfl:   •  FLUoxetine (PROzac) 40 MG capsule, Take 40 mg by mouth Daily., Disp: , Rfl:   •  lisinopril (PRINIVIL,ZESTRIL) 40 MG tablet, Take 40 mg by mouth Daily., Disp: , Rfl:   •  Aspirin-Acetaminophen-Caffeine (GOODY HEADACHE PO), Take  by mouth., Disp: , Rfl:   •  buPROPion XL  "(Wellbutrin XL) 300 MG 24 hr tablet, Take 1 tablet by mouth Every Morning., Disp: 30 tablet, Rfl: 11  •  linaclotide (LINZESS) 72 MCG capsule capsule, Take 1 capsule by mouth Daily. Take 1 tablet once daily on an empty stomach at least 30 minutes prior to the first meal of the day. (Patient taking differently: Take 72 mcg by mouth As Needed. Take 1 tablet once daily on an empty stomach at least 30 minutes prior to the first meal of the day.), Disp: 30 capsule, Rfl: 5  •  naproxen (EC NAPROSYN) 375 MG tablet delayed-release EC tablet, Take 1 tablet by mouth Daily As Needed for Mild Pain . Take with food, Disp: 30 each, Rfl: 3    Review of Systems   Constitutional: Negative for activity change, appetite change, chills, diaphoresis, fatigue and fever.   HENT: Negative for congestion, drooling, ear discharge, ear pain, mouth sores, nosebleeds, postnasal drip, rhinorrhea, sinus pressure, sneezing and sore throat.    Eyes: Negative for pain, discharge and visual disturbance.   Respiratory: Positive for shortness of breath. Negative for cough, chest tightness and wheezing.    Cardiovascular: Positive for chest pain. Negative for palpitations and leg swelling.   Gastrointestinal: Negative for abdominal pain, constipation, diarrhea, nausea and vomiting.   Endocrine: Negative for cold intolerance, heat intolerance, polydipsia, polyphagia and polyuria.   Musculoskeletal: Negative for arthralgias, myalgias and neck pain.   Skin: Negative for rash and wound.   Neurological: Negative for dizziness, syncope, speech difficulty, weakness, light-headedness and headaches.   Hematological: Negative for adenopathy. Does not bruise/bleed easily.   Psychiatric/Behavioral: Negative for confusion, dysphoric mood and sleep disturbance. The patient is not nervous/anxious.    All other systems reviewed and are negative.    /81 (BP Location: Right arm, Patient Position: Sitting, Cuff Size: Large Adult)   Pulse 60   Ht 175.3 cm (69\")   " Wt 104 kg (229 lb 3.2 oz)   SpO2 98%   BMI 33.85 kg/m²     Objective   No Known Allergies    Physical Exam  Vitals reviewed.   Constitutional:       Appearance: Normal appearance. He is well-developed.   HENT:      Head: Normocephalic.   Eyes:      Conjunctiva/sclera: Conjunctivae normal.   Neck:      Vascular: No JVD.   Cardiovascular:      Rate and Rhythm: Normal rate and regular rhythm.   Pulmonary:      Effort: Pulmonary effort is normal.      Breath sounds: Normal breath sounds.   Musculoskeletal:      Cervical back: Neck supple.      Right lower leg: No edema.      Left lower leg: No edema.   Skin:     General: Skin is warm and dry.   Neurological:      Mental Status: He is alert and oriented to person, place, and time.   Psychiatric:         Attention and Perception: Attention normal.         Mood and Affect: Mood normal.         Speech: Speech normal.         Behavior: Behavior normal. Behavior is cooperative.         Cognition and Memory: Cognition normal.           ECG 12 Lead    Date/Time: 2/14/2023 9:39 AM  Performed by: Angelica Maria APRN  Authorized by: Angelica Maria APRN   Comparison: not compared with previous ECG   Previous ECG: no previous ECG available  Rhythm: sinus rhythm  Rate: normal  BPM: 62  Other findings: T wave abnormality    Clinical impression: non-specific ECG  Comments: QT/QTc - 386/391            LABS  WBC   Date Value Ref Range Status   01/19/2022 6.84 3.40 - 10.80 10*3/mm3 Final     RBC   Date Value Ref Range Status   01/19/2022 5.27 4.14 - 5.80 10*6/mm3 Final     Hemoglobin   Date Value Ref Range Status   01/19/2022 15.9 13.0 - 17.7 g/dL Final     Hematocrit   Date Value Ref Range Status   01/19/2022 46.0 37.5 - 51.0 % Final     MCV   Date Value Ref Range Status   01/19/2022 87.3 79.0 - 97.0 fL Final     MCH   Date Value Ref Range Status   01/19/2022 30.2 26.6 - 33.0 pg Final     MCHC   Date Value Ref Range Status   01/19/2022 34.6 31.5 - 35.7 g/dL Final     RDW   Date  Value Ref Range Status   01/19/2022 12.5 12.3 - 15.4 % Final     RDW-SD   Date Value Ref Range Status   01/19/2022 39.1 37.0 - 54.0 fl Final     MPV   Date Value Ref Range Status   01/19/2022 10.9 6.0 - 12.0 fL Final     Platelets   Date Value Ref Range Status   01/19/2022 263 140 - 450 10*3/mm3 Final     Neutrophil %   Date Value Ref Range Status   01/19/2022 52.0 42.7 - 76.0 % Final     Lymphocyte %   Date Value Ref Range Status   01/19/2022 34.5 19.6 - 45.3 % Final     Monocyte %   Date Value Ref Range Status   01/19/2022 8.8 5.0 - 12.0 % Final     Eosinophil %   Date Value Ref Range Status   01/19/2022 2.6 0.3 - 6.2 % Final     Basophil %   Date Value Ref Range Status   01/19/2022 1.8 (H) 0.0 - 1.5 % Final     Immature Grans %   Date Value Ref Range Status   01/19/2022 0.3 0.0 - 0.5 % Final     Neutrophils, Absolute   Date Value Ref Range Status   01/19/2022 3.56 1.70 - 7.00 10*3/mm3 Final     Lymphocytes, Absolute   Date Value Ref Range Status   01/19/2022 2.36 0.70 - 3.10 10*3/mm3 Final     Monocytes, Absolute   Date Value Ref Range Status   01/19/2022 0.60 0.10 - 0.90 10*3/mm3 Final     Eosinophils, Absolute   Date Value Ref Range Status   01/19/2022 0.18 0.00 - 0.40 10*3/mm3 Final     Basophils, Absolute   Date Value Ref Range Status   01/19/2022 0.12 0.00 - 0.20 10*3/mm3 Final     Immature Grans, Absolute   Date Value Ref Range Status   01/19/2022 0.02 0.00 - 0.05 10*3/mm3 Final     nRBC   Date Value Ref Range Status   01/19/2022 0.0 0.0 - 0.2 /100 WBC Final         Total Cholesterol   Date Value Ref Range Status   01/19/2022 154 0 - 200 mg/dL Final     Triglycerides   Date Value Ref Range Status   01/19/2022 82 0 - 150 mg/dL Final     HDL Cholesterol   Date Value Ref Range Status   01/19/2022 38 (L) 40 - 60 mg/dL Final     LDL Cholesterol    Date Value Ref Range Status   01/19/2022 100 0 - 100 mg/dL Final             Assessment & Plan   Diagnoses and all orders for this visit:    1. Chest pain, unspecified  type (Primary)  -     ECG 12 Lead  -     Stress Test With Myocardial Perfusion (1 Day); Future  EKG, reviewed and discussed, no acute changes  Arrange for ischemic evaluation  Continue on aspirin  Advised if new or worsening symptoms while awaiting work-up, go to the ER.  He expresses understanding    2. Dyspnea, unspecified type  -     XR Chest 2 View; Future  -     Adult Transthoracic Echo Complete W/ Cont if Necessary Per Protocol; Future  Further evaluation will be arranged    3. Primary hypertension  Controlled, continue current therapy, sodium restrictions, routine monitoring    4. Hyperlipidemia, unspecified hyperlipidemia type  Continue on Lipitor, request most recent labs from PCP    5. Prediabetes  Tight glycemic control for overall health benefit encouraged    6. Multiple risk factors for coronary artery disease    7. Tobacco use disorder, mild, in early remission  Congratulated on recent smoking cessation, encouraged to refrain from restarting.          Review of medical record  Follow-up in 4 to 6 weeks, sooner if needed

## 2023-03-06 ENCOUNTER — OFFICE VISIT (OUTPATIENT)
Dept: GASTROENTEROLOGY | Facility: CLINIC | Age: 53
End: 2023-03-06
Payer: COMMERCIAL

## 2023-03-06 VITALS — HEIGHT: 69 IN | BODY MASS INDEX: 32.58 KG/M2 | WEIGHT: 220 LBS

## 2023-03-06 DIAGNOSIS — I10 PRIMARY HYPERTENSION: ICD-10-CM

## 2023-03-06 DIAGNOSIS — Z12.11 ENCOUNTER FOR SCREENING FOR MALIGNANT NEOPLASM OF COLON: Primary | ICD-10-CM

## 2023-03-06 DIAGNOSIS — E78.5 HYPERLIPIDEMIA, UNSPECIFIED HYPERLIPIDEMIA TYPE: ICD-10-CM

## 2023-03-06 PROCEDURE — 99204 OFFICE O/P NEW MOD 45 MIN: CPT | Performed by: PHYSICIAN ASSISTANT

## 2023-03-06 RX ORDER — SODIUM, POTASSIUM,MAG SULFATES 17.5-3.13G
1 SOLUTION, RECONSTITUTED, ORAL ORAL TAKE AS DIRECTED
Qty: 354 ML | Refills: 0 | Status: ON HOLD | OUTPATIENT
Start: 2023-03-06 | End: 2023-04-04

## 2023-03-06 NOTE — PROGRESS NOTES
DATE OF CONSULTATION:  3/6/2023    REFERRING PHYSICIAN:  No ref. provider found    CHIEF COMPLAINT:  Chief Complaint   Patient presents with   • Colon Cancer Screening       HISTORY OF PRESENT ILLNESS:   Sterling Brady is a very pleasant 52 y.o. male who is being seen today at the request of No ref. provider found for evaluation and treatment of screening colonoscopy.  Patient is currently undergoing work-up from cardiology for chest pain-he is scheduled to undergo echo and stress test this month.  He is currently not on any blood thinning medications however takes lisinopril 40 mg daily for hypertension.  He has never underwent colonoscopy in the past.  Denies any bright red blood per rectum or melena.  Denies family history of colon cancer.    REVIEW OF SYSTEMS:   Review of Systems   Constitutional: Negative.    HENT: Negative.    Eyes: Negative.    Respiratory: Negative.    Cardiovascular: Negative.    Gastrointestinal: Negative for abdominal distention, abdominal pain, anal bleeding, blood in stool, constipation, diarrhea, nausea, rectal pain and vomiting.   Endocrine: Negative.    Genitourinary: Negative.    Musculoskeletal: Negative.    Skin: Negative.    Allergic/Immunologic: Negative.    Neurological: Negative.    Hematological: Negative.    Psychiatric/Behavioral: Negative.        PAST MEDICAL HISTORY:  Past Medical History:   Diagnosis Date   • Hypertension        PAST SURGICAL HISTORY:  Past Surgical History:   Procedure Laterality Date   • WISDOM TOOTH EXTRACTION         FAMILY HISTORY:  Family History   Problem Relation Age of Onset   • Hypertension Mother    • Heart disease Mother    • Diabetes Mother    • No Known Problems Father    • No Known Problems Sister    • Alzheimer's disease Maternal Grandmother    • Cancer Paternal Grandfather        SOCIAL HISTORY:  Social History     Socioeconomic History   • Marital status:    Tobacco Use   • Smoking status: Former     Types: Cigarettes     Quit  date: 2023     Years since quittin.0   • Smokeless tobacco: Never   Vaping Use   • Vaping Use: Never used   Substance and Sexual Activity   • Alcohol use: Not Currently   • Drug use: Never   • Sexual activity: Yes       MEDICATIONS:      Current Outpatient Medications:   •  Aspirin-Acetaminophen-Caffeine (GOODY HEADACHE PO), Take  by mouth., Disp: , Rfl:   •  atorvastatin (LIPITOR) 20 MG tablet, Take 1 tablet by mouth Daily., Disp: , Rfl:   •  buPROPion XL (Wellbutrin XL) 300 MG 24 hr tablet, Take 1 tablet by mouth Every Morning., Disp: 30 tablet, Rfl: 11  •  cyclobenzaprine (FLEXERIL) 10 MG tablet, TAKE 1/2 TO 1 TAB BY MOUTH NIGHTLY AS NEEDED FOR NECK/BACK PAIN, Disp: 30 tablet, Rfl: 2  •  famotidine (Pepcid) 20 MG tablet, Take 1 tablet by mouth 2 (Two) Times a Day As Needed for Heartburn., Disp: 180 tablet, Rfl: 3  •  FLUoxetine (PROzac) 10 MG capsule, Take 1 capsule by mouth Daily., Disp: 30 capsule, Rfl: 2  •  FLUoxetine (PROzac) 20 MG capsule, Take 1 capsule by mouth Daily., Disp: , Rfl:   •  FLUoxetine (PROzac) 40 MG capsule, Take 1 capsule by mouth Daily., Disp: , Rfl:   •  linaclotide (LINZESS) 72 MCG capsule capsule, Take 1 capsule by mouth Daily. Take 1 tablet once daily on an empty stomach at least 30 minutes prior to the first meal of the day. (Patient taking differently: Take 1 capsule by mouth As Needed. Take 1 tablet once daily on an empty stomach at least 30 minutes prior to the first meal of the day.), Disp: 30 capsule, Rfl: 5  •  lisinopril (PRINIVIL,ZESTRIL) 40 MG tablet, Take 1 tablet by mouth Daily., Disp: , Rfl:   •  naproxen (EC NAPROSYN) 375 MG tablet delayed-release EC tablet, Take 1 tablet by mouth Daily As Needed for Mild Pain . Take with food, Disp: 30 each, Rfl: 3  •  sodium-potassium-magnesium sulfates (SUPREP) 17.5-3.13-1.6 GM/177ML solution oral solution, Take 1 bottle by mouth Take As Directed for 2 doses. Take one bottle with 16oz of water. Then within the hour drink an  "additional 32oz of water., Disp: 354 mL, Rfl: 0    ALLERGIES:  No Known Allergies    VISIT VITALS/PHYSICAL EXAM:  Ht 175.3 cm (69\")   Wt 99.8 kg (220 lb)   BMI 32.49 kg/m²   Physical Exam  Constitutional:       General: He is not in acute distress.     Appearance: Normal appearance. He is well-developed.   HENT:      Head: Normocephalic and atraumatic.   Eyes:      Pupils: Pupils are equal, round, and reactive to light.   Cardiovascular:      Rate and Rhythm: Normal rate and regular rhythm.      Heart sounds: Normal heart sounds.   Pulmonary:      Effort: Pulmonary effort is normal. No respiratory distress.      Breath sounds: Normal breath sounds. No wheezing, rhonchi or rales.   Abdominal:      General: Abdomen is flat. Bowel sounds are normal. There is no distension.      Palpations: Abdomen is soft. There is no mass.      Tenderness: There is no abdominal tenderness. There is no guarding or rebound.      Hernia: No hernia is present.   Musculoskeletal:         General: No swelling. Normal range of motion.      Cervical back: Normal range of motion and neck supple.      Right lower leg: No edema.      Left lower leg: No edema.   Skin:     General: Skin is warm and dry.   Neurological:      Mental Status: He is alert and oriented to person, place, and time.   Psychiatric:         Attention and Perception: Attention normal.         Mood and Affect: Mood normal.         Speech: Speech normal.         Behavior: Behavior normal. Behavior is cooperative.         Thought Content: Thought content normal.           PATHOLOGY:  None      ENDOSCOPY:  None      IMAGING:     XR Chest 2 View    Result Date: 2/14/2023    Unremarkable radiographic appearance of the chest.  This report was finalized on 2/14/2023 10:08 AM by Dr. Cristofer Thomas MD.         RECENT LABS:  Lab Results   Component Value Date    WBC 6.84 01/19/2022    HGB 15.9 01/19/2022    HCT 46.0 01/19/2022    MCV 87.3 01/19/2022    RDW 12.5 01/19/2022     " 01/19/2022    NEUTRORELPCT 52.0 01/19/2022    LYMPHORELPCT 34.5 01/19/2022    MONORELPCT 8.8 01/19/2022    EOSRELPCT 2.6 01/19/2022    BASORELPCT 1.8 (H) 01/19/2022    NEUTROABS 3.56 01/19/2022    LYMPHSABS 2.36 01/19/2022       Lab Results   Component Value Date     01/19/2022    K 4.4 01/19/2022    CO2 24.0 01/19/2022     01/19/2022    BUN 11 01/19/2022    CREATININE 0.86 01/19/2022    EGFRIFNONA 95 01/19/2022    GLUCOSE 121 (H) 01/19/2022    CALCIUM 9.2 01/19/2022    ALKPHOS 54 01/19/2022    AST 16 01/19/2022    ALT 23 01/19/2022    BILITOT 0.4 01/19/2022    ALBUMIN 4.60 01/19/2022    PROTEINTOT 6.6 01/19/2022         ASSESSMENT & PLAN:  Patient will be scheduled for screening colonoscopy-at this time cardiac clearance is not needed however pending stress test results as well as echo it may be needed.  Suprep sent to patient's pharmacy   Diagnosis Plan   1. Encounter for screening for malignant neoplasm of colon  sodium-potassium-magnesium sulfates (SUPREP) 17.5-3.13-1.6 GM/177ML solution oral solution    Case Request    Follow Anesthesia Guidelines / Protocol    Obtain Informed Consent    Case Request      2. Primary hypertension  Case Request    Follow Anesthesia Guidelines / Protocol    Obtain Informed Consent    Case Request      3. Hyperlipidemia, unspecified hyperlipidemia type  Case Request    Follow Anesthesia Guidelines / Protocol    Obtain Informed Consent    Case Request          Return for after procedure follow-up.          Electronically Signed by: Radha Cuba PA-C , March 6, 2023 09:42 EST       CC:   No ref. provider found  Eric Lehman APRN    Thank you so much for allowing us to participate in the care of Sterling Brady . Please do not hesitate to contact us with any questions or concerns.

## 2023-03-10 ENCOUNTER — PATIENT ROUNDING (BHMG ONLY) (OUTPATIENT)
Dept: GASTROENTEROLOGY | Facility: CLINIC | Age: 53
End: 2023-03-10
Payer: COMMERCIAL

## 2023-03-20 ENCOUNTER — HOSPITAL ENCOUNTER (OUTPATIENT)
Dept: CARDIOLOGY | Facility: HOSPITAL | Age: 53
Discharge: HOME | End: 2023-03-20
Payer: COMMERCIAL

## 2023-03-20 ENCOUNTER — HOSPITAL ENCOUNTER (OUTPATIENT)
Dept: NUCLEAR MEDICINE | Facility: HOSPITAL | Age: 53
Discharge: HOME | End: 2023-03-20
Payer: COMMERCIAL

## 2023-03-20 ENCOUNTER — TELEPHONE (OUTPATIENT)
Dept: CARDIOLOGY | Facility: CLINIC | Age: 53
End: 2023-03-20
Payer: COMMERCIAL

## 2023-03-20 DIAGNOSIS — R07.9 CHEST PAIN, UNSPECIFIED TYPE: ICD-10-CM

## 2023-03-20 DIAGNOSIS — R06.00 DYSPNEA, UNSPECIFIED TYPE: ICD-10-CM

## 2023-03-20 LAB
BH CV NUCLEAR PRIOR STUDY: 3
BH CV REST NUCLEAR ISOTOPE DOSE: 10.2 MCI
BH CV STRESS BP STAGE 1: NORMAL
BH CV STRESS BP STAGE 2: NORMAL
BH CV STRESS DURATION MIN STAGE 1: 3
BH CV STRESS DURATION MIN STAGE 2: 3
BH CV STRESS DURATION MIN STAGE 3: 2
BH CV STRESS DURATION SEC STAGE 1: 0
BH CV STRESS DURATION SEC STAGE 2: 0
BH CV STRESS DURATION SEC STAGE 3: 0
BH CV STRESS GRADE STAGE 1: 10
BH CV STRESS GRADE STAGE 2: 12
BH CV STRESS GRADE STAGE 3: 14
BH CV STRESS HR STAGE 1: 99
BH CV STRESS HR STAGE 2: 134
BH CV STRESS HR STAGE 3: 155
BH CV STRESS METS STAGE 1: 5
BH CV STRESS METS STAGE 2: 7.5
BH CV STRESS METS STAGE 3: 10
BH CV STRESS NUCLEAR ISOTOPE DOSE: 30.2 MCI
BH CV STRESS PROTOCOL 1: NORMAL
BH CV STRESS RECOVERY BP: NORMAL MMHG
BH CV STRESS RECOVERY HR: 99 BPM
BH CV STRESS SPEED STAGE 1: 1.7
BH CV STRESS SPEED STAGE 2: 2.5
BH CV STRESS SPEED STAGE 3: 3.4
BH CV STRESS STAGE 1: 1
BH CV STRESS STAGE 2: 2
BH CV STRESS STAGE 3: 3
LV EF NUC BP: 63 %
MAXIMAL PREDICTED HEART RATE: 170 BPM
PERCENT MAX PREDICTED HR: 91.18 %
STRESS BASELINE BP: NORMAL MMHG
STRESS BASELINE HR: 90 BPM
STRESS PERCENT HR: 107 %
STRESS POST ESTIMATED WORKLOAD: 10.1 METS
STRESS POST EXERCISE DUR MIN: 8 MIN
STRESS POST PEAK BP: NORMAL MMHG
STRESS POST PEAK HR: 155 BPM
STRESS TARGET HR: 145 BPM

## 2023-03-20 PROCEDURE — 93306 TTE W/DOPPLER COMPLETE: CPT

## 2023-03-20 PROCEDURE — 78452 HT MUSCLE IMAGE SPECT MULT: CPT

## 2023-03-20 PROCEDURE — 93018 CV STRESS TEST I&R ONLY: CPT | Performed by: INTERNAL MEDICINE

## 2023-03-20 PROCEDURE — 93017 CV STRESS TEST TRACING ONLY: CPT

## 2023-03-20 PROCEDURE — 0 TECHNETIUM SESTAMIBI: Performed by: NURSE PRACTITIONER

## 2023-03-20 PROCEDURE — 93306 TTE W/DOPPLER COMPLETE: CPT | Performed by: INTERNAL MEDICINE

## 2023-03-20 PROCEDURE — A9500 TC99M SESTAMIBI: HCPCS | Performed by: NURSE PRACTITIONER

## 2023-03-20 PROCEDURE — 78452 HT MUSCLE IMAGE SPECT MULT: CPT | Performed by: INTERNAL MEDICINE

## 2023-03-20 RX ORDER — METOPROLOL SUCCINATE 25 MG/1
25 TABLET, EXTENDED RELEASE ORAL DAILY
Qty: 30 TABLET | Refills: 0 | Status: ON HOLD | OUTPATIENT
Start: 2023-03-20 | End: 2023-04-04

## 2023-03-20 RX ORDER — ISOSORBIDE MONONITRATE 30 MG/1
30 TABLET, EXTENDED RELEASE ORAL DAILY
Qty: 30 TABLET | Refills: 0 | Status: ON HOLD | OUTPATIENT
Start: 2023-03-20 | End: 2023-04-04

## 2023-03-20 RX ADMIN — TECHNETIUM TC 99M SESTAMIBI 1 DOSE: 1 INJECTION INTRAVENOUS at 08:50

## 2023-03-20 RX ADMIN — TECHNETIUM TC 99M SESTAMIBI 1 DOSE: 1 INJECTION INTRAVENOUS at 10:22

## 2023-03-20 NOTE — TELEPHONE ENCOUNTER
----- Message from JOSE Sanchez sent at 3/20/2023  4:00 PM EDT -----  Nuclear stress test is abnormal. Refer for cardiac cath. Continue on ASA, Lipitor and Lisinopril. Recommend start beta blocker Toprol XL 25 mg daily and Imdur 30 mg daily. If he has new or worsening symptoms go to ER. Monitor VS

## 2023-03-25 LAB
BH CV ECHO MEAS - ACS: 2.3 CM
BH CV ECHO MEAS - AO MAX PG: 7 MMHG
BH CV ECHO MEAS - AO MEAN PG: 4 MMHG
BH CV ECHO MEAS - AO ROOT DIAM: 3.1 CM
BH CV ECHO MEAS - AO V2 MAX: 132 CM/SEC
BH CV ECHO MEAS - AO V2 VTI: 27.7 CM
BH CV ECHO MEAS - EDV(CUBED): 140.6 ML
BH CV ECHO MEAS - EDV(MOD-SP4): 66.7 ML
BH CV ECHO MEAS - EF(MOD-SP4): 76 %
BH CV ECHO MEAS - ESV(CUBED): 29.8 ML
BH CV ECHO MEAS - ESV(MOD-SP4): 16 ML
BH CV ECHO MEAS - FS: 40.4 %
BH CV ECHO MEAS - IVS/LVPW: 0.67 CM
BH CV ECHO MEAS - IVSD: 0.6 CM
BH CV ECHO MEAS - LA DIMENSION: 3.7 CM
BH CV ECHO MEAS - LAT PEAK E' VEL: 9.8 CM/SEC
BH CV ECHO MEAS - LV DIASTOLIC VOL/BSA (35-75): 31 CM2
BH CV ECHO MEAS - LV MASS(C)D: 133.8 GRAMS
BH CV ECHO MEAS - LV SYSTOLIC VOL/BSA (12-30): 7.4 CM2
BH CV ECHO MEAS - LVIDD: 5.2 CM
BH CV ECHO MEAS - LVIDS: 3.1 CM
BH CV ECHO MEAS - LVOT AREA: 3.1 CM2
BH CV ECHO MEAS - LVOT DIAM: 2 CM
BH CV ECHO MEAS - LVPWD: 0.9 CM
BH CV ECHO MEAS - MED PEAK E' VEL: 8.9 CM/SEC
BH CV ECHO MEAS - MV A MAX VEL: 60.4 CM/SEC
BH CV ECHO MEAS - MV E MAX VEL: 69.8 CM/SEC
BH CV ECHO MEAS - MV E/A: 1.16
BH CV ECHO MEAS - PA ACC TIME: 0.12 SEC
BH CV ECHO MEAS - PA PR(ACCEL): 24.3 MMHG
BH CV ECHO MEAS - SI(MOD-SP4): 23.6 ML/M2
BH CV ECHO MEAS - SV(MOD-SP4): 50.7 ML
BH CV ECHO MEAS - TAPSE (>1.6): 1.89 CM
BH CV ECHO MEASUREMENTS AVERAGE E/E' RATIO: 7.47
LEFT ATRIUM VOLUME INDEX: 23.9 ML/M2
MAXIMAL PREDICTED HEART RATE: 170 BPM
STRESS TARGET HR: 145 BPM

## 2023-03-27 ENCOUNTER — OFFICE VISIT (OUTPATIENT)
Dept: CARDIOLOGY | Facility: CLINIC | Age: 53
End: 2023-03-27
Payer: COMMERCIAL

## 2023-03-27 VITALS
WEIGHT: 222.4 LBS | BODY MASS INDEX: 32.94 KG/M2 | OXYGEN SATURATION: 93 % | SYSTOLIC BLOOD PRESSURE: 119 MMHG | HEIGHT: 69 IN | HEART RATE: 64 BPM | DIASTOLIC BLOOD PRESSURE: 79 MMHG

## 2023-03-27 DIAGNOSIS — R73.03 PREDIABETES: ICD-10-CM

## 2023-03-27 DIAGNOSIS — E78.5 HYPERLIPIDEMIA, UNSPECIFIED HYPERLIPIDEMIA TYPE: ICD-10-CM

## 2023-03-27 DIAGNOSIS — I10 PRIMARY HYPERTENSION: ICD-10-CM

## 2023-03-27 DIAGNOSIS — Z91.89 MULTIPLE RISK FACTORS FOR CORONARY ARTERY DISEASE: ICD-10-CM

## 2023-03-27 DIAGNOSIS — R94.39 ABNORMAL NUCLEAR STRESS TEST: Primary | ICD-10-CM

## 2023-03-27 DIAGNOSIS — F17.201 TOBACCO USE DISORDER, MILD, IN EARLY REMISSION: ICD-10-CM

## 2023-03-27 PROCEDURE — 99214 OFFICE O/P EST MOD 30 MIN: CPT | Performed by: NURSE PRACTITIONER

## 2023-03-27 RX ORDER — ASPIRIN 81 MG/1
81 TABLET ORAL DAILY
Qty: 30 TABLET | Refills: 0
Start: 2023-03-27

## 2023-03-27 NOTE — H&P (VIEW-ONLY)
Subjective     Sterling Brady is a 52 y.o. male.   Chief Complaint   Patient presents with   • Chest Pain     Follow up on abnormal stress test and echo.     History of Present Illness   Sterling Brady is a 52-year-old male who presents to the clinic today for cardiology follow-up.    Hypertension currently on Lisinopril 40 mg daily and Toprol XL 25 mg daily.  Home BPs are well controlled.  He tries to adhere to low-sodium dietary intake. He quit smoking about 4-6 weeks ago.    Hyperlipidemia currently on Lipitor.  No recent labs available for review.  He reports tolerating it well and denies any medication side effects.     Prediabetes currently diet controlled.  He continues to monitor his sugar readings which PCP is managing.     Multiple risk factors for coronary artery disease, recently underwent cardiovascular testing due to chest discomfort.  He denies further episodes of chest discomfort, denies palpitations or shortness of air. He reports tolerating Toprol and Imdur recently added to his medical regimen well.  He states initially he had some headache and fatigue but after several days he feels he is tolerating it well. Family hx for mother with MI and cardiomyopathy.     Patient Active Problem List   Diagnosis   • Nausea   • Lower abdominal pain   • Constipation   • Hemorrhoids   • Flatulence   • Abdominal distention   • Anal bleeding   • Heartburn   • Gastroesophageal reflux disease   • Hyperlipidemia   • Primary hypertension   • Prediabetes   • Multiple risk factors for coronary artery disease   • Tobacco use disorder, mild, in early remission     Past Medical History:   Diagnosis Date   • Hypertension      Past Surgical History:   Procedure Laterality Date   • WISDOM TOOTH EXTRACTION         Family History   Problem Relation Age of Onset   • Hypertension Mother    • Heart disease Mother    • Diabetes Mother    • No Known Problems Father    • No Known Problems Sister    • Alzheimer's disease Maternal  Grandmother    • Cancer Paternal Grandfather      Social History     Tobacco Use   • Smoking status: Former     Types: Cigarettes     Quit date: 2023     Years since quittin.1   • Smokeless tobacco: Never   Vaping Use   • Vaping Use: Never used   Substance Use Topics   • Alcohol use: Not Currently   • Drug use: Never         The following portions of the patient's history were reviewed and updated as appropriate: allergies, current medications, past family history, past medical history, past social history, past surgical history and problem list.    No Known Allergies      Current Outpatient Medications:   •  atorvastatin (LIPITOR) 20 MG tablet, Take 1 tablet by mouth Daily., Disp: , Rfl:   •  famotidine (Pepcid) 20 MG tablet, Take 1 tablet by mouth 2 (Two) Times a Day As Needed for Heartburn., Disp: 180 tablet, Rfl: 3  •  FLUoxetine (PROzac) 20 MG capsule, Take 1 capsule by mouth Daily., Disp: , Rfl:   •  FLUoxetine (PROzac) 40 MG capsule, Take 1 capsule by mouth Daily., Disp: , Rfl:   •  isosorbide mononitrate (IMDUR) 30 MG 24 hr tablet, Take 1 tablet by mouth Daily., Disp: 30 tablet, Rfl: 0  •  linaclotide (LINZESS) 72 MCG capsule capsule, Take 1 capsule by mouth Daily. Take 1 tablet once daily on an empty stomach at least 30 minutes prior to the first meal of the day. (Patient taking differently: Take 1 capsule by mouth As Needed. Take 1 tablet once daily on an empty stomach at least 30 minutes prior to the first meal of the day.), Disp: 30 capsule, Rfl: 5  •  lisinopril (PRINIVIL,ZESTRIL) 40 MG tablet, Take 1 tablet by mouth Daily., Disp: , Rfl:   •  metoprolol succinate XL (Toprol XL) 25 MG 24 hr tablet, Take 1 tablet by mouth Daily., Disp: 30 tablet, Rfl: 0  •  naproxen (EC NAPROSYN) 375 MG tablet delayed-release EC tablet, Take 1 tablet by mouth Daily As Needed for Mild Pain . Take with food, Disp: 30 each, Rfl: 3  •  aspirin 81 MG EC tablet, Take 1 tablet by mouth Daily., Disp: 30 tablet, Rfl: 0  •   buPROPion XL (Wellbutrin XL) 300 MG 24 hr tablet, Take 1 tablet by mouth Every Morning. (Patient not taking: Reported on 3/27/2023), Disp: 30 tablet, Rfl: 11  •  cyclobenzaprine (FLEXERIL) 10 MG tablet, TAKE 1/2 TO 1 TAB BY MOUTH NIGHTLY AS NEEDED FOR NECK/BACK PAIN (Patient not taking: Reported on 3/27/2023), Disp: 30 tablet, Rfl: 2  •  FLUoxetine (PROzac) 10 MG capsule, Take 1 capsule by mouth Daily. (Patient not taking: Reported on 3/27/2023), Disp: 30 capsule, Rfl: 2  •  sodium-potassium-magnesium sulfates (SUPREP) 17.5-3.13-1.6 GM/177ML solution oral solution, Take 1 bottle by mouth Take As Directed for 2 doses. Take one bottle with 16oz of water. Then within the hour drink an additional 32oz of water. (Patient not taking: Reported on 3/27/2023), Disp: 354 mL, Rfl: 0    Review of Systems   Constitutional: Negative for activity change, appetite change, chills, diaphoresis, fatigue and fever.   HENT: Negative for congestion, drooling, ear discharge, ear pain, mouth sores, nosebleeds, postnasal drip, rhinorrhea, sinus pressure, sneezing and sore throat.    Eyes: Negative for pain, discharge and visual disturbance.   Respiratory: Negative for cough, chest tightness, shortness of breath and wheezing.    Cardiovascular: Negative for chest pain, palpitations and leg swelling.   Gastrointestinal: Negative for abdominal pain, constipation, diarrhea, nausea and vomiting.   Endocrine: Negative for cold intolerance, heat intolerance, polydipsia, polyphagia and polyuria.   Musculoskeletal: Negative for arthralgias, myalgias and neck pain.   Skin: Negative for rash and wound.   Neurological: Negative for dizziness, syncope, speech difficulty, weakness, light-headedness and headaches.   Hematological: Negative for adenopathy. Does not bruise/bleed easily.   Psychiatric/Behavioral: Negative for confusion, dysphoric mood and sleep disturbance. The patient is not nervous/anxious.    All other systems reviewed and are  "negative.    /79 (BP Location: Right arm, Patient Position: Sitting, Cuff Size: Adult)   Pulse 64   Ht 175.3 cm (69\")   Wt 101 kg (222 lb 6.4 oz)   SpO2 93%   BMI 32.84 kg/m²     Objective   No Known Allergies    Physical Exam  Vitals reviewed.   Constitutional:       Appearance: Normal appearance. He is well-developed and overweight.   HENT:      Head: Normocephalic.   Eyes:      Conjunctiva/sclera: Conjunctivae normal.   Neck:      Vascular: No carotid bruit or JVD.   Cardiovascular:      Rate and Rhythm: Normal rate and regular rhythm.   Pulmonary:      Effort: Pulmonary effort is normal.      Breath sounds: Normal breath sounds.   Musculoskeletal:      Cervical back: Neck supple.      Right lower leg: No edema.      Left lower leg: No edema.   Skin:     General: Skin is warm and dry.   Neurological:      Mental Status: He is alert and oriented to person, place, and time.   Psychiatric:         Attention and Perception: Attention normal.         Mood and Affect: Mood normal.         Speech: Speech normal.         Behavior: Behavior normal. Behavior is cooperative.         Cognition and Memory: Cognition normal.           LABS  WBC   Date Value Ref Range Status   01/19/2022 6.84 3.40 - 10.80 10*3/mm3 Final     RBC   Date Value Ref Range Status   01/19/2022 5.27 4.14 - 5.80 10*6/mm3 Final     Hemoglobin   Date Value Ref Range Status   01/19/2022 15.9 13.0 - 17.7 g/dL Final     Hematocrit   Date Value Ref Range Status   01/19/2022 46.0 37.5 - 51.0 % Final     MCV   Date Value Ref Range Status   01/19/2022 87.3 79.0 - 97.0 fL Final     MCH   Date Value Ref Range Status   01/19/2022 30.2 26.6 - 33.0 pg Final     MCHC   Date Value Ref Range Status   01/19/2022 34.6 31.5 - 35.7 g/dL Final     RDW   Date Value Ref Range Status   01/19/2022 12.5 12.3 - 15.4 % Final     RDW-SD   Date Value Ref Range Status   01/19/2022 39.1 37.0 - 54.0 fl Final     MPV   Date Value Ref Range Status   01/19/2022 10.9 6.0 - 12.0 " fL Final     Platelets   Date Value Ref Range Status   01/19/2022 263 140 - 450 10*3/mm3 Final     Neutrophil %   Date Value Ref Range Status   01/19/2022 52.0 42.7 - 76.0 % Final     Lymphocyte %   Date Value Ref Range Status   01/19/2022 34.5 19.6 - 45.3 % Final     Monocyte %   Date Value Ref Range Status   01/19/2022 8.8 5.0 - 12.0 % Final     Eosinophil %   Date Value Ref Range Status   01/19/2022 2.6 0.3 - 6.2 % Final     Basophil %   Date Value Ref Range Status   01/19/2022 1.8 (H) 0.0 - 1.5 % Final     Immature Grans %   Date Value Ref Range Status   01/19/2022 0.3 0.0 - 0.5 % Final     Neutrophils, Absolute   Date Value Ref Range Status   01/19/2022 3.56 1.70 - 7.00 10*3/mm3 Final     Lymphocytes, Absolute   Date Value Ref Range Status   01/19/2022 2.36 0.70 - 3.10 10*3/mm3 Final     Monocytes, Absolute   Date Value Ref Range Status   01/19/2022 0.60 0.10 - 0.90 10*3/mm3 Final     Eosinophils, Absolute   Date Value Ref Range Status   01/19/2022 0.18 0.00 - 0.40 10*3/mm3 Final     Basophils, Absolute   Date Value Ref Range Status   01/19/2022 0.12 0.00 - 0.20 10*3/mm3 Final     Immature Grans, Absolute   Date Value Ref Range Status   01/19/2022 0.02 0.00 - 0.05 10*3/mm3 Final     nRBC   Date Value Ref Range Status   01/19/2022 0.0 0.0 - 0.2 /100 WBC Final         Total Cholesterol   Date Value Ref Range Status   01/19/2022 154 0 - 200 mg/dL Final     Triglycerides   Date Value Ref Range Status   01/19/2022 82 0 - 150 mg/dL Final     HDL Cholesterol   Date Value Ref Range Status   01/19/2022 38 (L) 40 - 60 mg/dL Final     LDL Cholesterol    Date Value Ref Range Status   01/19/2022 100 0 - 100 mg/dL Final     Imaging  XR Chest 2 View    Result Date: 2/14/2023    Unremarkable radiographic appearance of the chest.  This report was finalized on 2/14/2023 10:08 AM by Dr. Cristofer Thomas MD.      Nuclear stress test 3/20/2023  Interpretation Summary       •  Stress Procedure:  •  A stress test was performed following  the [facility] protocol.  •  Exercise duration (min) 8 min Estimated workload 10.1 METS  •  Baseline HR 90 bpm Baseline /72 mmHg ,Peak  bpm Peak /72 mmHg ,Recovery HR 99 bpm Recovery /73 mmHg Exercise Data Target HR (85%) 145 bpm Max. Pred. HR (100%) 170 bpm Percent Max Pred HR 91.18 %  •  An upward-sloping ST segment depression of 1 mm in the lateral leads was noted during stress (V5 and V6) which resolved in 3 min into recovery.  •  Patient denied any chest discomfort during exercise,  •  ECG evidence of myocardial ischemia  •  Findings consistent with an abnormal ECG stress test  •  Nuclear Perfusion Findings:  •  Myocardial perfusion imaging indicates a moderate-sized, mild degree of ischemia located in the inferior wall and septal wall.  •  Normal LV cavity size. Normal LV wall motion noted.  •  Left ventricular ejection fraction is normal (Calculated EF = 63%).  •  Impressions are consistent with an intermediate risk study.    Echocardiogram 3/20/2023  Interpretation Summary       •  Normal left ventricular cavity size, wall thickness and wall motion noted.  •  Left ventricular systolic function is normal. Left ventricular ejection fraction appears to be 61 - 65%.  •  Left ventricular diastolic function is consistent with (grade I) impaired relaxation.  •  No significant valvular heart disease noted.  •  No pericardial effusion detected.       Assessment & Plan   Diagnoses and all orders for this visit:    1. Abnormal nuclear stress test (Primary)  Discussed and reviewed nuclear stress test and echocardiogram.  Due to multiple risk factors, family history and intermittent symptoms with abnormal testing refer for cardiac cath.  We discussed risk and benefits of cardiac cath to include but not limited to hematoma, kidney injury, arrhythmia, MI, CVA and death.  He expresses understanding and is willing to proceed.  Cardiac cath will be arranged for patient  Continue on aspirin, Imdur, Toprol  and lisinopril and statin.    2. Hyperlipidemia, unspecified hyperlipidemia type  Continue on statin, heart healthy diet    3. Primary hypertension  Controlled, continue current therapy, sodium restrict    4. Prediabetes  Continue to monitor, low-carb diet recommended    5. Multiple risk factors for coronary artery disease  Further evaluation is warranted    6. Tobacco use disorder, mild, in early remission  Congratulated on recent smoking cessation    Other orders  -     aspirin 81 MG EC tablet; Take 1 tablet by mouth Daily.  Dispense: 30 tablet; Refill: 0        Discussed and reviewed echo and nuclear stress test  Review of medical record  Follow-up in 6 weeks, sooner if needed

## 2023-03-28 DIAGNOSIS — Z01.818 PREOP TESTING: Primary | ICD-10-CM

## 2023-03-28 DIAGNOSIS — Z91.89 MULTIPLE RISK FACTORS FOR CORONARY ARTERY DISEASE: ICD-10-CM

## 2023-03-28 DIAGNOSIS — R94.39 ABNORMAL NUCLEAR STRESS TEST: ICD-10-CM

## 2023-03-28 DIAGNOSIS — I10 PRIMARY HYPERTENSION: ICD-10-CM

## 2023-03-29 ENCOUNTER — PREP FOR SURGERY (OUTPATIENT)
Dept: OTHER | Facility: HOSPITAL | Age: 53
End: 2023-03-29
Payer: COMMERCIAL

## 2023-03-29 RX ORDER — SODIUM CHLORIDE 0.9 % (FLUSH) 0.9 %
10 SYRINGE (ML) INJECTION AS NEEDED
Status: CANCELLED | OUTPATIENT
Start: 2023-03-29

## 2023-03-29 RX ORDER — ASPIRIN 81 MG/1
81 TABLET ORAL DAILY
Status: CANCELLED | OUTPATIENT
Start: 2023-03-30

## 2023-03-29 RX ORDER — ASPIRIN 81 MG/1
324 TABLET, CHEWABLE ORAL ONCE
Status: CANCELLED | OUTPATIENT
Start: 2023-03-29 | End: 2023-03-29

## 2023-03-29 RX ORDER — SODIUM CHLORIDE 9 MG/ML
40 INJECTION, SOLUTION INTRAVENOUS AS NEEDED
Status: CANCELLED | OUTPATIENT
Start: 2023-03-29

## 2023-03-29 RX ORDER — SODIUM CHLORIDE 0.9 % (FLUSH) 0.9 %
10 SYRINGE (ML) INJECTION EVERY 12 HOURS SCHEDULED
Status: CANCELLED | OUTPATIENT
Start: 2023-03-29

## 2023-03-30 ENCOUNTER — LAB (OUTPATIENT)
Dept: LAB | Facility: HOSPITAL | Age: 53
End: 2023-03-30
Payer: COMMERCIAL

## 2023-03-30 DIAGNOSIS — Z91.89 MULTIPLE RISK FACTORS FOR CORONARY ARTERY DISEASE: ICD-10-CM

## 2023-03-30 DIAGNOSIS — R94.39 ABNORMAL NUCLEAR STRESS TEST: ICD-10-CM

## 2023-03-30 DIAGNOSIS — I10 PRIMARY HYPERTENSION: ICD-10-CM

## 2023-03-30 DIAGNOSIS — Z01.818 PREOP TESTING: ICD-10-CM

## 2023-03-30 PROCEDURE — 85025 COMPLETE CBC W/AUTO DIFF WBC: CPT

## 2023-03-30 PROCEDURE — 80053 COMPREHEN METABOLIC PANEL: CPT

## 2023-03-31 LAB
ALBUMIN SERPL-MCNC: 4.5 G/DL (ref 3.5–5.2)
ALBUMIN/GLOB SERPL: 2 G/DL
ALP SERPL-CCNC: 62 U/L (ref 39–117)
ALT SERPL W P-5'-P-CCNC: 24 U/L (ref 1–41)
ANION GAP SERPL CALCULATED.3IONS-SCNC: 9 MMOL/L (ref 5–15)
AST SERPL-CCNC: 18 U/L (ref 1–40)
BASOPHILS # BLD AUTO: 0.1 10*3/MM3 (ref 0–0.2)
BASOPHILS NFR BLD AUTO: 1.3 % (ref 0–1.5)
BILIRUB SERPL-MCNC: 0.5 MG/DL (ref 0–1.2)
BUN SERPL-MCNC: 11 MG/DL (ref 6–20)
BUN/CREAT SERPL: 11.2 (ref 7–25)
CALCIUM SPEC-SCNC: 9.2 MG/DL (ref 8.6–10.5)
CHLORIDE SERPL-SCNC: 104 MMOL/L (ref 98–107)
CO2 SERPL-SCNC: 27 MMOL/L (ref 22–29)
CREAT SERPL-MCNC: 0.98 MG/DL (ref 0.76–1.27)
DEPRECATED RDW RBC AUTO: 43.5 FL (ref 37–54)
EGFRCR SERPLBLD CKD-EPI 2021: 93.9 ML/MIN/1.73
EOSINOPHIL # BLD AUTO: 0.19 10*3/MM3 (ref 0–0.4)
EOSINOPHIL NFR BLD AUTO: 2.5 % (ref 0.3–6.2)
ERYTHROCYTE [DISTWIDTH] IN BLOOD BY AUTOMATED COUNT: 13.3 % (ref 12.3–15.4)
GLOBULIN UR ELPH-MCNC: 2.3 GM/DL
GLUCOSE SERPL-MCNC: 90 MG/DL (ref 65–99)
HCT VFR BLD AUTO: 45.8 % (ref 37.5–51)
HGB BLD-MCNC: 15.9 G/DL (ref 13–17.7)
IMM GRANULOCYTES # BLD AUTO: 0.02 10*3/MM3 (ref 0–0.05)
IMM GRANULOCYTES NFR BLD AUTO: 0.3 % (ref 0–0.5)
LYMPHOCYTES # BLD AUTO: 2.59 10*3/MM3 (ref 0.7–3.1)
LYMPHOCYTES NFR BLD AUTO: 34.3 % (ref 19.6–45.3)
MCH RBC QN AUTO: 30.5 PG (ref 26.6–33)
MCHC RBC AUTO-ENTMCNC: 34.7 G/DL (ref 31.5–35.7)
MCV RBC AUTO: 87.7 FL (ref 79–97)
MONOCYTES # BLD AUTO: 0.71 10*3/MM3 (ref 0.1–0.9)
MONOCYTES NFR BLD AUTO: 9.4 % (ref 5–12)
NEUTROPHILS NFR BLD AUTO: 3.94 10*3/MM3 (ref 1.7–7)
NEUTROPHILS NFR BLD AUTO: 52.2 % (ref 42.7–76)
NRBC BLD AUTO-RTO: 0 /100 WBC (ref 0–0.2)
PLATELET # BLD AUTO: 243 10*3/MM3 (ref 140–450)
PMV BLD AUTO: 11.1 FL (ref 6–12)
POTASSIUM SERPL-SCNC: 4.3 MMOL/L (ref 3.5–5.2)
PROT SERPL-MCNC: 6.8 G/DL (ref 6–8.5)
RBC # BLD AUTO: 5.22 10*6/MM3 (ref 4.14–5.8)
SODIUM SERPL-SCNC: 140 MMOL/L (ref 136–145)
WBC NRBC COR # BLD: 7.55 10*3/MM3 (ref 3.4–10.8)

## 2023-04-04 ENCOUNTER — HOSPITAL ENCOUNTER (OUTPATIENT)
Facility: HOSPITAL | Age: 53
Setting detail: HOSPITAL OUTPATIENT SURGERY
Discharge: HOME | End: 2023-04-04
Attending: INTERNAL MEDICINE | Admitting: INTERNAL MEDICINE
Payer: COMMERCIAL

## 2023-04-04 VITALS
SYSTOLIC BLOOD PRESSURE: 117 MMHG | HEART RATE: 70 BPM | WEIGHT: 218 LBS | RESPIRATION RATE: 17 BRPM | HEIGHT: 69 IN | BODY MASS INDEX: 32.29 KG/M2 | DIASTOLIC BLOOD PRESSURE: 74 MMHG | TEMPERATURE: 97 F | OXYGEN SATURATION: 93 %

## 2023-04-04 DIAGNOSIS — Z91.89 MULTIPLE RISK FACTORS FOR CORONARY ARTERY DISEASE: ICD-10-CM

## 2023-04-04 DIAGNOSIS — R94.39 ABNORMAL NUCLEAR STRESS TEST: ICD-10-CM

## 2023-04-04 LAB
CHOLEST SERPL-MCNC: 85 MG/DL (ref 0–200)
HDLC SERPL-MCNC: 32 MG/DL (ref 40–60)
LDLC SERPL CALC-MCNC: 35 MG/DL (ref 0–100)
LDLC/HDLC SERPL: 1.09 {RATIO}
TRIGL SERPL-MCNC: 91 MG/DL (ref 0–150)
VLDLC SERPL-MCNC: 18 MG/DL (ref 5–40)

## 2023-04-04 PROCEDURE — C1769 GUIDE WIRE: HCPCS | Performed by: INTERNAL MEDICINE

## 2023-04-04 PROCEDURE — 80061 LIPID PANEL: CPT | Performed by: INTERNAL MEDICINE

## 2023-04-04 PROCEDURE — 93458 L HRT ARTERY/VENTRICLE ANGIO: CPT | Performed by: INTERNAL MEDICINE

## 2023-04-04 PROCEDURE — 25010000002 HEPARIN (PORCINE) PER 1000 UNITS: Performed by: INTERNAL MEDICINE

## 2023-04-04 PROCEDURE — 25510000001 IOPAMIDOL PER 1 ML: Performed by: INTERNAL MEDICINE

## 2023-04-04 PROCEDURE — C1894 INTRO/SHEATH, NON-LASER: HCPCS | Performed by: INTERNAL MEDICINE

## 2023-04-04 PROCEDURE — 25010000002 MIDAZOLAM PER 1 MG: Performed by: INTERNAL MEDICINE

## 2023-04-04 RX ORDER — SODIUM CHLORIDE 9 MG/ML
40 INJECTION, SOLUTION INTRAVENOUS AS NEEDED
Status: DISCONTINUED | OUTPATIENT
Start: 2023-04-04 | End: 2023-04-04 | Stop reason: HOSPADM

## 2023-04-04 RX ORDER — NICARDIPINE HCL-0.9% SOD CHLOR 1 MG/10 ML
SYRINGE (ML) INTRAVENOUS
Status: DISCONTINUED | OUTPATIENT
Start: 2023-04-04 | End: 2023-04-04 | Stop reason: HOSPADM

## 2023-04-04 RX ORDER — SODIUM CHLORIDE 0.9 % (FLUSH) 0.9 %
10 SYRINGE (ML) INJECTION EVERY 12 HOURS SCHEDULED
Status: DISCONTINUED | OUTPATIENT
Start: 2023-04-04 | End: 2023-04-04 | Stop reason: HOSPADM

## 2023-04-04 RX ORDER — ISOSORBIDE MONONITRATE 30 MG/1
30 TABLET, EXTENDED RELEASE ORAL NIGHTLY
COMMUNITY
End: 2023-04-04 | Stop reason: HOSPADM

## 2023-04-04 RX ORDER — ASPIRIN 81 MG/1
81 TABLET ORAL DAILY
Status: DISCONTINUED | OUTPATIENT
Start: 2023-04-05 | End: 2023-04-04 | Stop reason: HOSPADM

## 2023-04-04 RX ORDER — ACETAMINOPHEN 325 MG/1
650 TABLET ORAL EVERY 4 HOURS PRN
Status: DISCONTINUED | OUTPATIENT
Start: 2023-04-04 | End: 2023-04-04 | Stop reason: HOSPADM

## 2023-04-04 RX ORDER — SODIUM CHLORIDE 0.9 % (FLUSH) 0.9 %
10 SYRINGE (ML) INJECTION AS NEEDED
Status: DISCONTINUED | OUTPATIENT
Start: 2023-04-04 | End: 2023-04-04 | Stop reason: HOSPADM

## 2023-04-04 RX ORDER — SODIUM CHLORIDE 9 MG/ML
100 INJECTION, SOLUTION INTRAVENOUS CONTINUOUS
Status: DISCONTINUED | OUTPATIENT
Start: 2023-04-04 | End: 2023-04-04 | Stop reason: HOSPADM

## 2023-04-04 RX ORDER — SODIUM CHLORIDE 9 MG/ML
3 INJECTION, SOLUTION INTRAVENOUS CONTINUOUS
Status: ACTIVE | OUTPATIENT
Start: 2023-04-04 | End: 2023-04-04

## 2023-04-04 RX ORDER — METOPROLOL SUCCINATE 25 MG/1
25 TABLET, EXTENDED RELEASE ORAL NIGHTLY
COMMUNITY

## 2023-04-04 RX ORDER — MIDAZOLAM HYDROCHLORIDE 1 MG/ML
INJECTION INTRAMUSCULAR; INTRAVENOUS
Status: DISCONTINUED | OUTPATIENT
Start: 2023-04-04 | End: 2023-04-04 | Stop reason: HOSPADM

## 2023-04-04 RX ORDER — ASPIRIN 81 MG/1
324 TABLET, CHEWABLE ORAL ONCE
Status: COMPLETED | OUTPATIENT
Start: 2023-04-04 | End: 2023-04-04

## 2023-04-04 RX ORDER — LIDOCAINE HYDROCHLORIDE 10 MG/ML
INJECTION, SOLUTION EPIDURAL; INFILTRATION; INTRACAUDAL; PERINEURAL
Status: DISCONTINUED | OUTPATIENT
Start: 2023-04-04 | End: 2023-04-04 | Stop reason: HOSPADM

## 2023-04-04 RX ORDER — HEPARIN SODIUM 1000 [USP'U]/ML
INJECTION, SOLUTION INTRAVENOUS; SUBCUTANEOUS
Status: DISCONTINUED | OUTPATIENT
Start: 2023-04-04 | End: 2023-04-04 | Stop reason: HOSPADM

## 2023-04-04 RX ADMIN — SODIUM CHLORIDE 3 ML/KG/HR: 9 INJECTION, SOLUTION INTRAVENOUS at 06:56

## 2023-04-04 RX ADMIN — ASPIRIN 81 MG CHEWABLE TABLET 243 MG: 81 TABLET CHEWABLE at 06:55

## 2023-04-04 NOTE — INTERVAL H&P NOTE
H&P reviewed. The patient was examined and there are no changes to the H&P.      Mr. Brady is a 53-year-old gentleman that comes in for heart cath today.  He was seen at the [place] cardiology office and had a stress test Showed an upward sloping ST segment depression in 1 mm in the lateral leads myocardial perfusion imaging indicates a moderate size mild degree of ischemia located in the inferior wall and septal wall; EF of 63%.  Dr. Moran will proceed with heart cath and discuss treatment and further make recommendations after procedure.  Risk benefits were discussed and patient verbalized understanding.   We will proceed by right radial    Tia Norman PA-C

## 2023-04-04 NOTE — NURSING NOTE
Staff has reviewed chart and patient does not have a qualifying diagnosis for Phase II Cardiac Rehab at this time.  Staff available if further consultation is needed.

## 2023-04-04 NOTE — Clinical Note
PIG catheter removed  over the wire. Staged Advancement Flap Text: The defect edges were debeveled with a #15 scalpel blade.  Given the location of the defect, shape of the defect and the proximity to free margins a staged advancement flap was deemed most appropriate.  Using a sterile surgical marker, an appropriate advancement flap was drawn incorporating the defect and placing the expected incisions within the relaxed skin tension lines where possible. The area thus outlined was incised deep to adipose tissue with a #15 scalpel blade.  The skin margins were undermined to an appropriate distance in all directions utilizing iris scissors.

## 2023-04-04 NOTE — Clinical Note
Hemostasis started on the right radial artery. R-Band was used in achieving hemostasis. Radial compression device applied to vessel. Hemostasis achieved successfully. Closure device additional comment: 12ml air

## 2023-05-04 PROBLEM — Z12.11 ENCOUNTER FOR SCREENING FOR MALIGNANT NEOPLASM OF COLON: Status: ACTIVE | Noted: 2023-05-04

## 2023-05-08 ENCOUNTER — OFFICE VISIT (OUTPATIENT)
Dept: CARDIOLOGY | Facility: CLINIC | Age: 53
End: 2023-05-08
Payer: COMMERCIAL

## 2023-05-08 VITALS
HEIGHT: 69 IN | SYSTOLIC BLOOD PRESSURE: 128 MMHG | WEIGHT: 224.2 LBS | OXYGEN SATURATION: 98 % | DIASTOLIC BLOOD PRESSURE: 81 MMHG | HEART RATE: 75 BPM | BODY MASS INDEX: 33.21 KG/M2

## 2023-05-08 DIAGNOSIS — I10 PRIMARY HYPERTENSION: ICD-10-CM

## 2023-05-08 DIAGNOSIS — Z91.89 MULTIPLE RISK FACTORS FOR CORONARY ARTERY DISEASE: ICD-10-CM

## 2023-05-08 DIAGNOSIS — R73.03 PREDIABETES: ICD-10-CM

## 2023-05-08 DIAGNOSIS — F17.201 TOBACCO USE DISORDER, MILD, IN SUSTAINED REMISSION: ICD-10-CM

## 2023-05-08 DIAGNOSIS — E78.5 HYPERLIPIDEMIA, UNSPECIFIED HYPERLIPIDEMIA TYPE: Primary | ICD-10-CM

## 2023-05-08 PROCEDURE — 99213 OFFICE O/P EST LOW 20 MIN: CPT | Performed by: NURSE PRACTITIONER

## 2023-05-08 RX ORDER — METOPROLOL SUCCINATE 25 MG/1
25 TABLET, EXTENDED RELEASE ORAL NIGHTLY
Qty: 90 TABLET | Refills: 1 | Status: SHIPPED | OUTPATIENT
Start: 2023-05-08

## 2023-05-08 NOTE — LETTER
May 8, 2023     JOSE Perkins  [address] [facility] [address]    Patient: Sterling Brady   YOB: 1970   Date of Visit: 5/8/2023       Dear JOSE Perkins    Sterling Brady was in my office today. Below is a copy of my note.    If you have questions, please do not hesitate to call me. I look forward to following Sterling along with you.         Sincerely,        JOSE Walters        CC: No Recipients    Subjective     Sterling Brady is a 53 y.o. male.   Chief Complaint   Patient presents with   • Results     Pt here for results on heart cath at [facility].      History of Present Illness   Sterling Brady is a 53-year-old male who presents to the clinic today for cardiology follow-up.     Hypertension currently on Lisinopril 40 mg daily and Toprol XL 25 mg daily.  Home BPs are well controlled.  He tries to adhere to low-sodium dietary intake. He quit smoking about 2 months ago.     Hyperlipidemia currently on Lipitor.  He reports medication compliance and denies medication side effects.  Most recent LDL is 35.    Prediabetes currently diet controlled.  He continues to monitor his sugar readings which PCP is managing.      Multiple risk factors for coronary artery disease, recently underwent cardiac cath due to abnormal stress test.  Cardiac cath with essentially normal coronary arteries without evidence of hemodynamically significant CAD.  He had mild ectasia and plaque of the LAD and the diagonal suggesting presence of atherosclerotic process indicating the need for optimizing risk factor management. He denies further episodes of chest discomfort, palpitations or shortness of air.   He reports tolerating Toprol well however he has recently been out of it.  His Imdur was discontinued.       Patient Active Problem List   Diagnosis   • Nausea   • Lower abdominal pain   • Constipation   • Hemorrhoids   • Flatulence   • Abdominal distention   • Anal bleeding   • Heartburn    • Gastroesophageal reflux disease   • Hyperlipidemia   • Primary hypertension   • Prediabetes   • Multiple risk factors for coronary artery disease   • Tobacco use disorder, mild, in early remission   • Abnormal nuclear stress test   • Encounter for screening for malignant neoplasm of colon     Past Medical History:   Diagnosis Date   • Hypertension      Past Surgical History:   Procedure Laterality Date   • CARDIAC CATHETERIZATION N/A 2023    Procedure: Left Heart Cath;  Surgeon: Hay Moran MD;  [facility];  Service: Cardiology;  Laterality: N/A;   • WISDOM TOOTH EXTRACTION         Family History   Problem Relation Age of Onset   • Hypertension Mother    • Heart disease Mother    • Diabetes Mother    • No Known Problems Father    • No Known Problems Sister    • Alzheimer's disease Maternal Grandmother    • Cancer Paternal Grandfather      Social History     Tobacco Use   • Smoking status: Former     Types: Cigarettes     Quit date: 2023     Years since quittin.2   • Smokeless tobacco: Never   Vaping Use   • Vaping Use: Never used   Substance Use Topics   • Alcohol use: Not Currently   • Drug use: Never         The following portions of the patient's history were reviewed and updated as appropriate: allergies, current medications, past family history, past medical history, past social history, past surgical history and problem list.    No Known Allergies      Current Outpatient Medications:   •  aspirin 81 MG EC tablet, Take 1 tablet by mouth Daily., Disp: 30 tablet, Rfl: 0  •  atorvastatin (LIPITOR) 20 MG tablet, Take 1 tablet by mouth Daily., Disp: , Rfl:   •  famotidine (Pepcid) 20 MG tablet, Take 1 tablet by mouth 2 (Two) Times a Day As Needed for Heartburn., Disp: 180 tablet, Rfl: 3  •  FLUoxetine (PROzac) 20 MG capsule, Take 1 capsule by mouth Daily., Disp: , Rfl:   •  FLUoxetine (PROzac) 40 MG capsule, Take 1 capsule by mouth Daily., Disp: , Rfl:   •  lisinopril  "(PRINIVIL,ZESTRIL) 40 MG tablet, Take 1 tablet by mouth Daily., Disp: , Rfl:   •  metoprolol succinate XL (TOPROL-XL) 25 MG 24 hr tablet, Take 1 tablet by mouth Every Night., Disp: 90 tablet, Rfl: 1  •  naproxen (EC NAPROSYN) 375 MG tablet delayed-release EC tablet, Take 1 tablet by mouth Daily As Needed for Mild Pain . Take with food, Disp: 30 each, Rfl: 3    Review of Systems   Constitutional: Negative for activity change, appetite change, chills, diaphoresis, fatigue and fever.   HENT: Negative for congestion, drooling, ear discharge, ear pain, mouth sores, nosebleeds, postnasal drip, rhinorrhea, sinus pressure, sneezing and sore throat.    Eyes: Negative for pain, discharge and visual disturbance.   Respiratory: Negative for cough, chest tightness, shortness of breath and wheezing.    Cardiovascular: Negative for chest pain, palpitations and leg swelling.   Gastrointestinal: Negative for abdominal pain, constipation, diarrhea, nausea and vomiting.   Endocrine: Negative for cold intolerance, heat intolerance, polydipsia, polyphagia and polyuria.   Musculoskeletal: Negative for arthralgias, myalgias and neck pain.   Skin: Negative for rash and wound.   Neurological: Negative for syncope, speech difficulty, weakness, light-headedness and headaches.   Hematological: Negative for adenopathy. Does not bruise/bleed easily.   Psychiatric/Behavioral: Negative for confusion, dysphoric mood and sleep disturbance. The patient is not nervous/anxious.    All other systems reviewed and are negative.      /81 (BP Location: Right arm, Patient Position: Sitting, Cuff Size: Adult)   Pulse 75   Ht 175.3 cm (69\")   Wt 102 kg (224 lb 3.2 oz)   SpO2 98%   BMI 33.11 kg/m²     Objective   No Known Allergies    Physical Exam  Vitals reviewed.   Constitutional:       Appearance: Normal appearance. He is well-developed and overweight.   HENT:      Head: Normocephalic.   Eyes:      Conjunctiva/sclera: Conjunctivae normal.   Neck: "      Thyroid: No thyromegaly.      Vascular: No carotid bruit or JVD.   Cardiovascular:      Rate and Rhythm: Normal rate and regular rhythm.   Pulmonary:      Effort: Pulmonary effort is normal.      Breath sounds: Normal breath sounds.   Musculoskeletal:      Cervical back: Neck supple.      Right lower leg: No edema.      Left lower leg: No edema.   Skin:     General: Skin is warm and dry.   Neurological:      Mental Status: He is alert and oriented to person, place, and time.   Psychiatric:         Attention and Perception: Attention normal.         Mood and Affect: Mood normal.         Speech: Speech normal.         Behavior: Behavior normal. Behavior is cooperative.         Cognition and Memory: Cognition normal.           LABS  WBC   Date Value Ref Range Status   03/30/2023 7.55 3.40 - 10.80 10*3/mm3 Final     RBC   Date Value Ref Range Status   03/30/2023 5.22 4.14 - 5.80 10*6/mm3 Final     Hemoglobin   Date Value Ref Range Status   03/30/2023 15.9 13.0 - 17.7 g/dL Final     Hematocrit   Date Value Ref Range Status   03/30/2023 45.8 37.5 - 51.0 % Final     MCV   Date Value Ref Range Status   03/30/2023 87.7 79.0 - 97.0 fL Final     MCH   Date Value Ref Range Status   03/30/2023 30.5 26.6 - 33.0 pg Final     MCHC   Date Value Ref Range Status   03/30/2023 34.7 31.5 - 35.7 g/dL Final     RDW   Date Value Ref Range Status   03/30/2023 13.3 12.3 - 15.4 % Final     RDW-SD   Date Value Ref Range Status   03/30/2023 43.5 37.0 - 54.0 fl Final     MPV   Date Value Ref Range Status   03/30/2023 11.1 6.0 - 12.0 fL Final     Platelets   Date Value Ref Range Status   03/30/2023 243 140 - 450 10*3/mm3 Final     Neutrophil %   Date Value Ref Range Status   03/30/2023 52.2 42.7 - 76.0 % Final     Lymphocyte %   Date Value Ref Range Status   03/30/2023 34.3 19.6 - 45.3 % Final     Monocyte %   Date Value Ref Range Status   03/30/2023 9.4 5.0 - 12.0 % Final     Eosinophil %   Date Value Ref Range Status   03/30/2023 2.5 0.3 -  6.2 % Final     Basophil %   Date Value Ref Range Status   03/30/2023 1.3 0.0 - 1.5 % Final     Immature Grans %   Date Value Ref Range Status   03/30/2023 0.3 0.0 - 0.5 % Final     Neutrophils, Absolute   Date Value Ref Range Status   03/30/2023 3.94 1.70 - 7.00 10*3/mm3 Final     Lymphocytes, Absolute   Date Value Ref Range Status   03/30/2023 2.59 0.70 - 3.10 10*3/mm3 Final     Monocytes, Absolute   Date Value Ref Range Status   03/30/2023 0.71 0.10 - 0.90 10*3/mm3 Final     Eosinophils, Absolute   Date Value Ref Range Status   03/30/2023 0.19 0.00 - 0.40 10*3/mm3 Final     Basophils, Absolute   Date Value Ref Range Status   03/30/2023 0.10 0.00 - 0.20 10*3/mm3 Final     Immature Grans, Absolute   Date Value Ref Range Status   03/30/2023 0.02 0.00 - 0.05 10*3/mm3 Final     nRBC   Date Value Ref Range Status   03/30/2023 0.0 0.0 - 0.2 /100 WBC Final       Total Cholesterol   Date Value Ref Range Status   04/04/2023 85 0 - 200 mg/dL Final     Triglycerides   Date Value Ref Range Status   04/04/2023 91 0 - 150 mg/dL Final     HDL Cholesterol   Date Value Ref Range Status   04/04/2023 32 (L) 40 - 60 mg/dL Final     LDL Cholesterol    Date Value Ref Range Status   04/04/2023 35 0 - 100 mg/dL Final     IMAGING   XR Chest 2 View    Result Date: 2/14/2023    Unremarkable radiographic appearance of the chest.  This report was finalized on 2/14/2023 10:08 AM by Dr. Cristofer Thomas MD.      Cardiac Catheterization/Vascular Study    Result Date: 4/4/2023  Angiographically normal coronary arteries without any evidence of hemodynamically significant coronary artery disease. Mild ectasia and plaque of the LAD and the diagonal is noted suggesting presence of atherosclerotic process and indicating need for optimal risk factor management. Normal left ventricular systolic function, estimated EF 65%. Normal hemodynamics. RECOMMENDATIONS: Medical therapy and risk factor management. Evaluation for noncardiac etiology of symptoms.  Indications: Chest pain and abnormal stress test. Access: Right radial. Procedures: Left heart catheterization. Left ventriculogram. Selective coronary angiography. Arterial site hemostasis with radial band. Procedure narrative: The patient was brought to the catheterization lab in a fasting condition.  Access site was prepped and draped in standard sterile fashion.  Lidocaine was injected and arterial access was obtained by percutaneous anterior wall puncture technique.  A 6 Vincentian arterial sheath was placed. Above procedures were performed without complications.  At the conclusion the arterial sheath was removed and hemostasis was achieved.  The patient was transferred to the unit in a stable condition. Hemodynamic Findings: Heart Rate: 80/minute. LV pressure: 136/4-14 mmHg, on pull back no gradient was recorded across the aortic valve. Angiographic Findings: Right coronary dominance. LM: Angiographically normal. LAD: Minimal proximal and mid segment ectasia with mild, 10 to 20% irregular plaque without hemodynamically significant disease.  The large diagonal branch also has mild ectasia and 20% plaque without occlusive disease. LCX: Minor irregularities with angiographically near normal vessel. RCA: Dominant vessel, angiographically normal. LV: Left ventriculogram performed in 30 ISAAC projection revealed normal global and regional left ventricular systolic function with estimated ejection fraction of 65%.  No mitral regurgitation was noted. Complications: No acute procedure related complications.         Assessment & Plan   Diagnoses and all orders for this visit:    1. Hyperlipidemia, unspecified hyperlipidemia type (Primary)  Continue on statin, heart healthy diet, LDL goal is less than 70    2. Primary hypertension  Controlled, continue current therapy, sodium restrictions    3. Prediabetes  Encouraged in tight glycemic control for overall health benefit    4. Multiple risk factors for coronary artery  disease  Discussed and reviewed cardiac cath, risk factor modification continue to be emphasized    5. Tobacco use disorder, mild, in sustained remission  Congratulated on remaining tobacco free      Other orders  -     metoprolol succinate XL (TOPROL-XL) 25 MG 24 hr tablet; Take 1 tablet by mouth Every Night.  Dispense: 90 tablet; Refill: 1    Lifestyle modifications including heart healthy diet, regular exercise, maintenance of desirable body weight and avoidance of tobacco products    Follow-up in 6 months, sooner if needed

## 2023-05-08 NOTE — PROGRESS NOTES
Subjective     Sterling Brady is a 53 y.o. male.   Chief Complaint   Patient presents with   • Results     Pt here for results on heart cath at [facility].      History of Present Illness   Sterling Brady is a 53-year-old male who presents to the clinic today for cardiology follow-up.     Hypertension currently on Lisinopril 40 mg daily and Toprol XL 25 mg daily.  Home BPs are well controlled.  He tries to adhere to low-sodium dietary intake. He quit smoking about 2 months ago.     Hyperlipidemia currently on Lipitor.  He reports medication compliance and denies medication side effects.  Most recent LDL is 35.    Prediabetes currently diet controlled.  He continues to monitor his sugar readings which PCP is managing.      Multiple risk factors for coronary artery disease, recently underwent cardiac cath due to abnormal stress test.  Cardiac cath with essentially normal coronary arteries without evidence of hemodynamically significant CAD.  He had mild ectasia and plaque of the LAD and the diagonal suggesting presence of atherosclerotic process indicating the need for optimizing risk factor management. He denies further episodes of chest discomfort, palpitations or shortness of air.   He reports tolerating Toprol well however he has recently been out of it.  His Imdur was discontinued.       Patient Active Problem List   Diagnosis   • Nausea   • Lower abdominal pain   • Constipation   • Hemorrhoids   • Flatulence   • Abdominal distention   • Anal bleeding   • Heartburn   • Gastroesophageal reflux disease   • Hyperlipidemia   • Primary hypertension   • Prediabetes   • Multiple risk factors for coronary artery disease   • Tobacco use disorder, mild, in early remission   • Abnormal nuclear stress test   • Encounter for screening for malignant neoplasm of colon     Past Medical History:   Diagnosis Date   • Hypertension      Past Surgical History:   Procedure Laterality Date   • CARDIAC CATHETERIZATION N/A  2023    Procedure: Left Heart Cath;  Surgeon: Hay Moran MD;  Location: [facility] CATH INVASIVE LOCATION;  Service: Cardiology;  Laterality: N/A;   • WISDOM TOOTH EXTRACTION         Family History   Problem Relation Age of Onset   • Hypertension Mother    • Heart disease Mother    • Diabetes Mother    • No Known Problems Father    • No Known Problems Sister    • Alzheimer's disease Maternal Grandmother    • Cancer Paternal Grandfather      Social History     Tobacco Use   • Smoking status: Former     Types: Cigarettes     Quit date: 2023     Years since quittin.2   • Smokeless tobacco: Never   Vaping Use   • Vaping Use: Never used   Substance Use Topics   • Alcohol use: Not Currently   • Drug use: Never         The following portions of the patient's history were reviewed and updated as appropriate: allergies, current medications, past family history, past medical history, past social history, past surgical history and problem list.    No Known Allergies      Current Outpatient Medications:   •  aspirin 81 MG EC tablet, Take 1 tablet by mouth Daily., Disp: 30 tablet, Rfl: 0  •  atorvastatin (LIPITOR) 20 MG tablet, Take 1 tablet by mouth Daily., Disp: , Rfl:   •  famotidine (Pepcid) 20 MG tablet, Take 1 tablet by mouth 2 (Two) Times a Day As Needed for Heartburn., Disp: 180 tablet, Rfl: 3  •  FLUoxetine (PROzac) 20 MG capsule, Take 1 capsule by mouth Daily., Disp: , Rfl:   •  FLUoxetine (PROzac) 40 MG capsule, Take 1 capsule by mouth Daily., Disp: , Rfl:   •  lisinopril (PRINIVIL,ZESTRIL) 40 MG tablet, Take 1 tablet by mouth Daily., Disp: , Rfl:   •  metoprolol succinate XL (TOPROL-XL) 25 MG 24 hr tablet, Take 1 tablet by mouth Every Night., Disp: 90 tablet, Rfl: 1  •  naproxen (EC NAPROSYN) 375 MG tablet delayed-release EC tablet, Take 1 tablet by mouth Daily As Needed for Mild Pain . Take with food, Disp: 30 each, Rfl: 3    Review of Systems   Constitutional: Negative for activity change, appetite  "change, chills, diaphoresis, fatigue and fever.   HENT: Negative for congestion, drooling, ear discharge, ear pain, mouth sores, nosebleeds, postnasal drip, rhinorrhea, sinus pressure, sneezing and sore throat.    Eyes: Negative for pain, discharge and visual disturbance.   Respiratory: Negative for cough, chest tightness, shortness of breath and wheezing.    Cardiovascular: Negative for chest pain, palpitations and leg swelling.   Gastrointestinal: Negative for abdominal pain, constipation, diarrhea, nausea and vomiting.   Endocrine: Negative for cold intolerance, heat intolerance, polydipsia, polyphagia and polyuria.   Musculoskeletal: Negative for arthralgias, myalgias and neck pain.   Skin: Negative for rash and wound.   Neurological: Negative for syncope, speech difficulty, weakness, light-headedness and headaches.   Hematological: Negative for adenopathy. Does not bruise/bleed easily.   Psychiatric/Behavioral: Negative for confusion, dysphoric mood and sleep disturbance. The patient is not nervous/anxious.    All other systems reviewed and are negative.      /81 (BP Location: Right arm, Patient Position: Sitting, Cuff Size: Adult)   Pulse 75   Ht 175.3 cm (69\")   Wt 102 kg (224 lb 3.2 oz)   SpO2 98%   BMI 33.11 kg/m²     Objective   No Known Allergies    Physical Exam  Vitals reviewed.   Constitutional:       Appearance: Normal appearance. He is well-developed and overweight.   HENT:      Head: Normocephalic.   Eyes:      Conjunctiva/sclera: Conjunctivae normal.   Neck:      Thyroid: No thyromegaly.      Vascular: No carotid bruit or JVD.   Cardiovascular:      Rate and Rhythm: Normal rate and regular rhythm.   Pulmonary:      Effort: Pulmonary effort is normal.      Breath sounds: Normal breath sounds.   Musculoskeletal:      Cervical back: Neck supple.      Right lower leg: No edema.      Left lower leg: No edema.   Skin:     General: Skin is warm and dry.   Neurological:      Mental Status: He is " alert and oriented to person, place, and time.   Psychiatric:         Attention and Perception: Attention normal.         Mood and Affect: Mood normal.         Speech: Speech normal.         Behavior: Behavior normal. Behavior is cooperative.         Cognition and Memory: Cognition normal.           LABS  WBC   Date Value Ref Range Status   03/30/2023 7.55 3.40 - 10.80 10*3/mm3 Final     RBC   Date Value Ref Range Status   03/30/2023 5.22 4.14 - 5.80 10*6/mm3 Final     Hemoglobin   Date Value Ref Range Status   03/30/2023 15.9 13.0 - 17.7 g/dL Final     Hematocrit   Date Value Ref Range Status   03/30/2023 45.8 37.5 - 51.0 % Final     MCV   Date Value Ref Range Status   03/30/2023 87.7 79.0 - 97.0 fL Final     MCH   Date Value Ref Range Status   03/30/2023 30.5 26.6 - 33.0 pg Final     MCHC   Date Value Ref Range Status   03/30/2023 34.7 31.5 - 35.7 g/dL Final     RDW   Date Value Ref Range Status   03/30/2023 13.3 12.3 - 15.4 % Final     RDW-SD   Date Value Ref Range Status   03/30/2023 43.5 37.0 - 54.0 fl Final     MPV   Date Value Ref Range Status   03/30/2023 11.1 6.0 - 12.0 fL Final     Platelets   Date Value Ref Range Status   03/30/2023 243 140 - 450 10*3/mm3 Final     Neutrophil %   Date Value Ref Range Status   03/30/2023 52.2 42.7 - 76.0 % Final     Lymphocyte %   Date Value Ref Range Status   03/30/2023 34.3 19.6 - 45.3 % Final     Monocyte %   Date Value Ref Range Status   03/30/2023 9.4 5.0 - 12.0 % Final     Eosinophil %   Date Value Ref Range Status   03/30/2023 2.5 0.3 - 6.2 % Final     Basophil %   Date Value Ref Range Status   03/30/2023 1.3 0.0 - 1.5 % Final     Immature Grans %   Date Value Ref Range Status   03/30/2023 0.3 0.0 - 0.5 % Final     Neutrophils, Absolute   Date Value Ref Range Status   03/30/2023 3.94 1.70 - 7.00 10*3/mm3 Final     Lymphocytes, Absolute   Date Value Ref Range Status   03/30/2023 2.59 0.70 - 3.10 10*3/mm3 Final     Monocytes, Absolute   Date Value Ref Range Status    03/30/2023 0.71 0.10 - 0.90 10*3/mm3 Final     Eosinophils, Absolute   Date Value Ref Range Status   03/30/2023 0.19 0.00 - 0.40 10*3/mm3 Final     Basophils, Absolute   Date Value Ref Range Status   03/30/2023 0.10 0.00 - 0.20 10*3/mm3 Final     Immature Grans, Absolute   Date Value Ref Range Status   03/30/2023 0.02 0.00 - 0.05 10*3/mm3 Final     nRBC   Date Value Ref Range Status   03/30/2023 0.0 0.0 - 0.2 /100 WBC Final       Total Cholesterol   Date Value Ref Range Status   04/04/2023 85 0 - 200 mg/dL Final     Triglycerides   Date Value Ref Range Status   04/04/2023 91 0 - 150 mg/dL Final     HDL Cholesterol   Date Value Ref Range Status   04/04/2023 32 (L) 40 - 60 mg/dL Final     LDL Cholesterol    Date Value Ref Range Status   04/04/2023 35 0 - 100 mg/dL Final     IMAGING   XR Chest 2 View    Result Date: 2/14/2023    Unremarkable radiographic appearance of the chest.  This report was finalized on 2/14/2023 10:08 AM by Dr. Cristofer Thomas MD.      Cardiac Catheterization/Vascular Study    Result Date: 4/4/2023  Angiographically normal coronary arteries without any evidence of hemodynamically significant coronary artery disease. Mild ectasia and plaque of the LAD and the diagonal is noted suggesting presence of atherosclerotic process and indicating need for optimal risk factor management. Normal left ventricular systolic function, estimated EF 65%. Normal hemodynamics. RECOMMENDATIONS: Medical therapy and risk factor management. Evaluation for noncardiac etiology of symptoms. Indications: Chest pain and abnormal stress test. Access: Right radial. Procedures: Left heart catheterization. Left ventriculogram. Selective coronary angiography. Arterial site hemostasis with radial band. Procedure narrative: The patient was brought to the catheterization lab in a fasting condition.  Access site was prepped and draped in standard sterile fashion.  Lidocaine was injected and arterial access was obtained by  percutaneous anterior wall puncture technique.  A 6 [place] arterial sheath was placed. Above procedures were performed without complications.  At the conclusion the arterial sheath was removed and hemostasis was achieved.  The patient was transferred to the unit in a stable condition. Hemodynamic Findings: Heart Rate: 80/minute. LV pressure: 136/4-14 mmHg, on pull back no gradient was recorded across the aortic valve. Angiographic Findings: Right coronary dominance. LM: Angiographically normal. LAD: Minimal proximal and mid segment ectasia with mild, 10 to 20% irregular plaque without hemodynamically significant disease.  The large diagonal branch also has mild ectasia and 20% plaque without occlusive disease. LCX: Minor irregularities with angiographically near normal vessel. RCA: Dominant vessel, angiographically normal. LV: Left ventriculogram performed in 30 ISAAC projection revealed normal global and regional left ventricular systolic function with estimated ejection fraction of 65%.  No mitral regurgitation was noted. Complications: No acute procedure related complications.         Assessment & Plan   Diagnoses and all orders for this visit:    1. Hyperlipidemia, unspecified hyperlipidemia type (Primary)  Continue on statin, heart healthy diet, LDL goal is less than 70    2. Primary hypertension  Controlled, continue current therapy, sodium restrictions    3. Prediabetes  Encouraged in tight glycemic control for overall health benefit    4. Multiple risk factors for coronary artery disease  Discussed and reviewed cardiac cath, risk factor modification continue to be emphasized    5. Tobacco use disorder, mild, in sustained remission  Congratulated on remaining tobacco free      Other orders  -     metoprolol succinate XL (TOPROL-XL) 25 MG 24 hr tablet; Take 1 tablet by mouth Every Night.  Dispense: 90 tablet; Refill: 1    Lifestyle modifications including heart healthy diet, regular exercise, maintenance of  desirable body weight and avoidance of tobacco products    Follow-up in 6 months, sooner if needed

## 2023-05-12 DIAGNOSIS — Z12.11 ENCOUNTER FOR SCREENING FOR MALIGNANT NEOPLASM OF COLON: Primary | ICD-10-CM

## 2023-05-12 RX ORDER — BISACODYL 5 MG/1
20 TABLET, DELAYED RELEASE ORAL ONCE
Qty: 4 TABLET | Refills: 0 | Status: SHIPPED | OUTPATIENT
Start: 2023-05-12 | End: 2023-05-12

## 2023-05-12 RX ORDER — POLYETHYLENE GLYCOL 3350 17 G/17G
510 POWDER, FOR SOLUTION ORAL ONCE
Qty: 510 G | Refills: 0 | Status: SHIPPED | OUTPATIENT
Start: 2023-05-12 | End: 2023-05-12

## 2023-05-16 ENCOUNTER — ANESTHESIA (OUTPATIENT)
Dept: PERIOP | Facility: HOSPITAL | Age: 53
End: 2023-05-16
Payer: COMMERCIAL

## 2023-05-16 ENCOUNTER — ANESTHESIA EVENT (OUTPATIENT)
Dept: PERIOP | Facility: HOSPITAL | Age: 53
End: 2023-05-16
Payer: COMMERCIAL

## 2023-05-16 ENCOUNTER — HOSPITAL ENCOUNTER (OUTPATIENT)
Facility: HOSPITAL | Age: 53
Setting detail: HOSPITAL OUTPATIENT SURGERY
Discharge: HOME | End: 2023-05-16
Attending: INTERNAL MEDICINE | Admitting: INTERNAL MEDICINE
Payer: COMMERCIAL

## 2023-05-16 VITALS
SYSTOLIC BLOOD PRESSURE: 130 MMHG | TEMPERATURE: 98.2 F | HEIGHT: 69 IN | DIASTOLIC BLOOD PRESSURE: 81 MMHG | BODY MASS INDEX: 33.33 KG/M2 | RESPIRATION RATE: 20 BRPM | OXYGEN SATURATION: 98 % | HEART RATE: 77 BPM | WEIGHT: 225 LBS

## 2023-05-16 DIAGNOSIS — E78.5 HYPERLIPIDEMIA, UNSPECIFIED HYPERLIPIDEMIA TYPE: ICD-10-CM

## 2023-05-16 DIAGNOSIS — I10 PRIMARY HYPERTENSION: ICD-10-CM

## 2023-05-16 DIAGNOSIS — Z12.11 ENCOUNTER FOR SCREENING FOR MALIGNANT NEOPLASM OF COLON: ICD-10-CM

## 2023-05-16 PROCEDURE — 25010000002 MIDAZOLAM PER 1 MG: Performed by: NURSE ANESTHETIST, CERTIFIED REGISTERED

## 2023-05-16 PROCEDURE — 25010000002 PROPOFOL 200 MG/20ML EMULSION: Performed by: NURSE ANESTHETIST, CERTIFIED REGISTERED

## 2023-05-16 PROCEDURE — 45385 COLONOSCOPY W/LESION REMOVAL: CPT | Performed by: INTERNAL MEDICINE

## 2023-05-16 RX ORDER — OXYCODONE HYDROCHLORIDE AND ACETAMINOPHEN 5; 325 MG/1; MG/1
1 TABLET ORAL ONCE AS NEEDED
Status: DISCONTINUED | OUTPATIENT
Start: 2023-05-16 | End: 2023-05-16 | Stop reason: HOSPADM

## 2023-05-16 RX ORDER — IPRATROPIUM BROMIDE AND ALBUTEROL SULFATE 2.5; .5 MG/3ML; MG/3ML
3 SOLUTION RESPIRATORY (INHALATION) ONCE AS NEEDED
Status: DISCONTINUED | OUTPATIENT
Start: 2023-05-16 | End: 2023-05-16 | Stop reason: HOSPADM

## 2023-05-16 RX ORDER — MEPERIDINE HYDROCHLORIDE 25 MG/ML
12.5 INJECTION INTRAMUSCULAR; INTRAVENOUS; SUBCUTANEOUS
Status: DISCONTINUED | OUTPATIENT
Start: 2023-05-16 | End: 2023-05-16 | Stop reason: HOSPADM

## 2023-05-16 RX ORDER — MIDAZOLAM HYDROCHLORIDE 1 MG/ML
1 INJECTION INTRAMUSCULAR; INTRAVENOUS
Status: DISCONTINUED | OUTPATIENT
Start: 2023-05-16 | End: 2023-05-16 | Stop reason: HOSPADM

## 2023-05-16 RX ORDER — SODIUM CHLORIDE, SODIUM LACTATE, POTASSIUM CHLORIDE, CALCIUM CHLORIDE 600; 310; 30; 20 MG/100ML; MG/100ML; MG/100ML; MG/100ML
100 INJECTION, SOLUTION INTRAVENOUS ONCE AS NEEDED
Status: DISCONTINUED | OUTPATIENT
Start: 2023-05-16 | End: 2023-05-16 | Stop reason: HOSPADM

## 2023-05-16 RX ORDER — FENTANYL CITRATE 50 UG/ML
50 INJECTION, SOLUTION INTRAMUSCULAR; INTRAVENOUS
Status: DISCONTINUED | OUTPATIENT
Start: 2023-05-16 | End: 2023-05-16 | Stop reason: HOSPADM

## 2023-05-16 RX ORDER — PROPOFOL 10 MG/ML
INJECTION, EMULSION INTRAVENOUS AS NEEDED
Status: DISCONTINUED | OUTPATIENT
Start: 2023-05-16 | End: 2023-05-16 | Stop reason: SURG

## 2023-05-16 RX ORDER — SODIUM CHLORIDE 0.9 % (FLUSH) 0.9 %
10 SYRINGE (ML) INJECTION EVERY 12 HOURS SCHEDULED
Status: DISCONTINUED | OUTPATIENT
Start: 2023-05-16 | End: 2023-05-16 | Stop reason: HOSPADM

## 2023-05-16 RX ORDER — SODIUM CHLORIDE, SODIUM LACTATE, POTASSIUM CHLORIDE, CALCIUM CHLORIDE 600; 310; 30; 20 MG/100ML; MG/100ML; MG/100ML; MG/100ML
125 INJECTION, SOLUTION INTRAVENOUS ONCE
Status: COMPLETED | OUTPATIENT
Start: 2023-05-16 | End: 2023-05-16

## 2023-05-16 RX ORDER — SODIUM CHLORIDE 9 MG/ML
40 INJECTION, SOLUTION INTRAVENOUS AS NEEDED
Status: DISCONTINUED | OUTPATIENT
Start: 2023-05-16 | End: 2023-05-16 | Stop reason: HOSPADM

## 2023-05-16 RX ORDER — ONDANSETRON 2 MG/ML
4 INJECTION INTRAMUSCULAR; INTRAVENOUS AS NEEDED
Status: DISCONTINUED | OUTPATIENT
Start: 2023-05-16 | End: 2023-05-16 | Stop reason: HOSPADM

## 2023-05-16 RX ORDER — SODIUM CHLORIDE 0.9 % (FLUSH) 0.9 %
10 SYRINGE (ML) INJECTION AS NEEDED
Status: DISCONTINUED | OUTPATIENT
Start: 2023-05-16 | End: 2023-05-16 | Stop reason: HOSPADM

## 2023-05-16 RX ORDER — LIDOCAINE HYDROCHLORIDE 20 MG/ML
INJECTION, SOLUTION EPIDURAL; INFILTRATION; INTRACAUDAL; PERINEURAL AS NEEDED
Status: DISCONTINUED | OUTPATIENT
Start: 2023-05-16 | End: 2023-05-16 | Stop reason: SURG

## 2023-05-16 RX ORDER — MIDAZOLAM HYDROCHLORIDE 1 MG/ML
INJECTION INTRAMUSCULAR; INTRAVENOUS AS NEEDED
Status: DISCONTINUED | OUTPATIENT
Start: 2023-05-16 | End: 2023-05-16 | Stop reason: SURG

## 2023-05-16 RX ADMIN — PROPOFOL 50 MG: 10 INJECTION, EMULSION INTRAVENOUS at 13:08

## 2023-05-16 RX ADMIN — SODIUM CHLORIDE, POTASSIUM CHLORIDE, SODIUM LACTATE AND CALCIUM CHLORIDE: 600; 310; 30; 20 INJECTION, SOLUTION INTRAVENOUS at 12:51

## 2023-05-16 RX ADMIN — PROPOFOL 50 MG: 10 INJECTION, EMULSION INTRAVENOUS at 13:05

## 2023-05-16 RX ADMIN — PROPOFOL 50 MG: 10 INJECTION, EMULSION INTRAVENOUS at 12:58

## 2023-05-16 RX ADMIN — MIDAZOLAM HYDROCHLORIDE 2 MG: 1 INJECTION, SOLUTION INTRAMUSCULAR; INTRAVENOUS at 12:51

## 2023-05-16 RX ADMIN — PROPOFOL 50 MG: 10 INJECTION, EMULSION INTRAVENOUS at 13:25

## 2023-05-16 RX ADMIN — PROPOFOL 50 MG: 10 INJECTION, EMULSION INTRAVENOUS at 13:11

## 2023-05-16 RX ADMIN — PROPOFOL 50 MG: 10 INJECTION, EMULSION INTRAVENOUS at 13:14

## 2023-05-16 RX ADMIN — LIDOCAINE HYDROCHLORIDE 60 MG: 20 INJECTION, SOLUTION EPIDURAL; INFILTRATION; INTRACAUDAL; PERINEURAL at 12:53

## 2023-05-16 RX ADMIN — PROPOFOL 50 MG: 10 INJECTION, EMULSION INTRAVENOUS at 12:56

## 2023-05-16 RX ADMIN — PROPOFOL 50 MG: 10 INJECTION, EMULSION INTRAVENOUS at 13:28

## 2023-05-16 RX ADMIN — PROPOFOL 100 MG: 10 INJECTION, EMULSION INTRAVENOUS at 12:53

## 2023-05-16 RX ADMIN — PROPOFOL 50 MG: 10 INJECTION, EMULSION INTRAVENOUS at 13:19

## 2023-05-16 RX ADMIN — PROPOFOL 50 MG: 10 INJECTION, EMULSION INTRAVENOUS at 13:02

## 2023-05-16 NOTE — H&P
[facility]    Patient Identification:  Name:  Sterling Brady  Age:  53 y.o.  Sex:  male  :  1970  MRN:  [id number]   Visit Number:  [id number]  Primary Care Physician:  Eric Lehman APRN       Chief complaint: Screening colonoscopy    History of presenting illness:  53 y.o. male presents today for screening colonoscopy.  He denies bright red blood per rectum or melena.  He has not had any unusual weight loss.  The patient has not had change in bowel pattern.  There is no family history of colon cancer.  This is his first colonoscopy.  The patient states that he recently underwent cardiac work-up.  The patient states that he underwent cardiac cath.  No stents were placed.  He states that he had a normal cardiac work-up.    ---------------------------------------------------------------------------------------------------------------------   Review of Systems   Constitutional: Negative for activity change, appetite change, chills, fatigue, fever and unexpected weight change.   HENT: Negative for mouth sores and trouble swallowing.    Eyes: Negative for photophobia, pain and redness.   Respiratory: Negative for cough and choking.    Cardiovascular: Positive for chest pain. Negative for leg swelling.   Gastrointestinal: Negative for abdominal distention, abdominal pain, anal bleeding, constipation, diarrhea, nausea, rectal pain and vomiting.   Endocrine: Negative for cold intolerance, heat intolerance and polyphagia.   Genitourinary: Negative for difficulty urinating and dysuria.   Musculoskeletal: Negative for arthralgias, joint swelling and myalgias.   Skin: Negative for rash and wound.   Allergic/Immunologic: Negative for environmental allergies and food allergies.   Neurological: Negative for dizziness and light-headedness.   Hematological: Negative for adenopathy. Does not bruise/bleed easily.   Psychiatric/Behavioral: Negative for sleep disturbance. The  patient is not nervous/anxious.       ---------------------------------------------------------------------------------------------------------------------   Past Medical History:   Diagnosis Date   • Hypertension      Past Surgical History:   Procedure Laterality Date   • CARDIAC CATHETERIZATION N/A 2023    Procedure: Left Heart Cath;  Surgeon: Hay Moran MD;  Location: [facility] CATH INVASIVE LOCATION;  Service: Cardiology;  Laterality: N/A;   • WISDOM TOOTH EXTRACTION       Family History   Problem Relation Age of Onset   • Hypertension Mother    • Heart disease Mother    • Diabetes Mother    • No Known Problems Father    • No Known Problems Sister    • Alzheimer's disease Maternal Grandmother    • Cancer Paternal Grandfather      Social History     Socioeconomic History   • Marital status:    Tobacco Use   • Smoking status: Former     Types: Cigarettes     Quit date: 2023     Years since quittin.2   • Smokeless tobacco: Never   Vaping Use   • Vaping Use: Never used   Substance and Sexual Activity   • Alcohol use: Not Currently   • Drug use: Never   • Sexual activity: Yes     ---------------------------------------------------------------------------------------------------------------------   Allergies:  Patient has no known allergies.  ---------------------------------------------------------------------------------------------------------------------   Prior to Admission Medications     Prescriptions Last Dose Informant Patient Reported? Taking?    atorvastatin (LIPITOR) 20 MG tablet 5/15/2023  Yes Yes    Take 1 tablet by mouth Daily.    famotidine (Pepcid) 20 MG tablet   No No    Take 1 tablet by mouth 2 (Two) Times a Day As Needed for Heartburn.    FLUoxetine (PROzac) 20 MG capsule 5/15/2023  Yes Yes    Take 1 capsule by mouth Daily.    FLUoxetine (PROzac) 40 MG capsule 5/15/2023  Yes Yes    Take 1 capsule by mouth Daily.    lisinopril (PRINIVIL,ZESTRIL) 40 MG tablet 5/15/2023  Yes Yes     Take 1 tablet by mouth Daily.    naproxen (EC NAPROSYN) 375 MG tablet delayed-release EC tablet   No No    Take 1 tablet by mouth Daily As Needed for Mild Pain . Take with food    aspirin 81 MG EC tablet 5/13/2023  No No    Take 1 tablet by mouth Daily.    metoprolol succinate XL (TOPROL-XL) 25 MG 24 hr tablet Past Week  No Yes    Take 1 tablet by mouth Every Night.        Hospital Scheduled Meds:  lactated ringers, 125 mL/hr, Intravenous, Once  sodium chloride, 10 mL, Intravenous, Q12H         ---------------------------------------------------------------------------------------------------------------------   Vital Signs:  Temp:  [98.6 °F (37 °C)] 98.6 °F (37 °C)  Heart Rate:  [69] 69  Resp:  [20] 20  BP: (130)/(88) 130/88      05/16/23  1046   Weight: 102 kg (225 lb)     Body mass index is 33.23 kg/m².  ---------------------------------------------------------------------------------------------------------------------   Physical Exam:  Constitutional:  Well-developed and well-nourished.  No respiratory distress.   Obese   HENT:  Head: Normocephalic and atraumatic.  Mouth:  Moist mucous membranes.    Eyes:  Conjunctivae and EOM are normal.  Pupils are equal, round, and reactive to light.  No scleral icterus.  Neck:  Neck supple.  No JVD present.    Cardiovascular:  Normal rate, regular rhythm and normal heart sounds with no murmur.  Pulmonary/Chest:  No respiratory distress, no wheezes, no crackles, with normal breath sounds and good air movement.  Abdominal:  Soft.  Bowel sounds are normal.  No distension and no tenderness.   Musculoskeletal:  No edema, no tenderness, and no deformity.  No red or swollen joints anywhere.    Neurological:  Alert and oriented to person, place, and time.  No cranial nerve deficit.  No tongue deviation.  No facial droop.  No slurred speech.   Skin:  Skin is warm and dry.  No rash noted.  No pallor.   Psychiatric:  Normal mood and affect.  Behavior is normal.  Judgment and thought  content normal.   Peripheral vascular:  No edema and strong pulses on all 4 extremities.  Genitourinary:  ---------------------------------------------------------------------------------------------------------------------                Invalid input(s): PROTCrCl cannot be calculated (Patient's most recent lab result is older than the maximum 30 days allowed.).  No results found for: AMMONIA          Lab Results   Component Value Date    HGBA1C 6.20 (H) 01/19/2022     No results found for: TSH, FREET4  No results found for: PREGTESTUR, PREGSERUM, HCG, HCGQUANT  Pain Management Panel          View : No data to display.                    No results found for: BLOODCX  No results found for: URINECX  No results found for: WOUNDCX  No results found for: STOOLCX      ---------------------------------------------------------------------------------------------------------------------  Imaging Results (Last 7 Days)     ** No results found for the last 168 hours. **          I have personally reviewed the radiology images and read the final radiology report.  ---------------------------------------------------------------------------------------------------------------------  Assessment and Plan:  Seed with screening colonoscopy    Candelaria Perez MD  05/16/23  11:43 EDT

## 2023-05-16 NOTE — ANESTHESIA POSTPROCEDURE EVALUATION
Patient: Sterling Brady    Procedure Summary     Date: 05/16/23 Room / Location: [facility] OR 07 /  [facility] OR    Anesthesia Start: 1251 Anesthesia Stop: 1332    Procedure: COLONOSCOPY Diagnosis:       Encounter for screening for malignant neoplasm of colon      Primary hypertension      Hyperlipidemia, unspecified hyperlipidemia type      (Encounter for screening for malignant neoplasm of colon [Z12.11])      (Primary hypertension [I10])      (Hyperlipidemia, unspecified hyperlipidemia type [E78.5])    Surgeons: Candelaria Perez MD Provider: Anatoliy Alvarenga DO    Anesthesia Type: general ASA Status: 2          Anesthesia Type: general    Vitals  No vitals data found for the desired time range.          Post Anesthesia Care and Evaluation    Patient location during evaluation: PHASE II  Patient participation: complete - patient participated  Level of consciousness: awake and alert  Pain score: 0  Pain management: adequate    Airway patency: patent  Anesthetic complications: No anesthetic complications    Cardiovascular status: acceptable  Respiratory status: acceptable  Hydration status: acceptable

## 2023-05-16 NOTE — ANESTHESIA PREPROCEDURE EVALUATION
Anesthesia Evaluation     Patient summary reviewed and Nursing notes reviewed   no history of anesthetic complications:  NPO Solid Status: > 8 hours  NPO Liquid Status: > 8 hours           Airway   Mallampati: II  TM distance: >3 FB  Neck ROM: full  No difficulty expected  Dental - normal exam     Pulmonary - normal exam    breath sounds clear to auscultation  (+) a smoker Former,   Cardiovascular - normal exam    ECG reviewed  Patient on routine beta blocker and Beta blocker given within 24 hours of surgery  Rhythm: regular  Rate: normal    (+) hypertension, GARCIA, hyperlipidemia,       Neuro/Psych- negative ROS  GI/Hepatic/Renal/Endo    (+)  GERD, GI bleeding ,     Musculoskeletal (-) negative ROS    Abdominal  - normal exam   Substance History - negative use     OB/GYN negative ob/gyn ROS         Other - negative ROS       ROS/Med Hx Other: PTCA 4/4/2023:  FINAL IMPRESSION:  Angiographically normal coronary arteries without any evidence of hemodynamically significant coronary artery disease.  Mild ectasia and plaque of the LAD and the diagonal is noted suggesting presence of atherosclerotic process and indicating need for optimal risk factor management.  Normal left ventricular systolic function, estimated EF 65%.  Normal hemodynamics.     RECOMMENDATIONS:  Medical therapy and risk factor management.  Evaluation for noncardiac etiology of symptoms.     Echo 3/20/2023:  •  Normal left ventricular cavity size, wall thickness and wall motion noted.  •  Left ventricular systolic function is normal. Left ventricular ejection fraction appears to be 61 - 65%.  •  Left ventricular diastolic function is consistent with (grade I) impaired relaxation.  •  No significant valvular heart disease noted.  •  No pericardial effusion detected.           Phys Exam Other: + beard                Anesthesia Plan    ASA 2     general   total IV anesthesia  intravenous induction     Anesthetic plan, risks, benefits, and alternatives have  been provided, discussed and informed consent has been obtained with: patient.  Pre-procedure education provided  Plan discussed with CRNA.        CODE STATUS:

## (undated) DEVICE — GLIDESHEATH SLENDER STAINLESS STEEL KIT: Brand: GLIDESHEATH SLENDER

## (undated) DEVICE — ENDOGATOR TUBING FOR ENDOGATOR EGP-100 IRRIGATION PUMP,OLYMPUS OFP PUMP, OLYMPUS AFU-100 PUMP AND ERBE EIP2 PUMP: Brand: ENDOGATOR

## (undated) DEVICE — GW INQWIRE FC PTFE STD J/1.5 .035 260

## (undated) DEVICE — Device

## (undated) DEVICE — Device: Brand: DEFENDO AIR/WATER/SUCTION AND BIOPSY VALVE

## (undated) DEVICE — CONN Y IRR DISP 1P/U

## (undated) DEVICE — MODEL BT2000 P/N 700287-012KIT CONTENTS: MANIFOLD WITH SALINE AND CONTRAST PORTS, SALINE TUBING WITH SPIKE AND HAND SYRINGE, TRANSDUCER: Brand: BT2000 AUTOMATED MANIFOLD KIT

## (undated) DEVICE — PK CATH CARD 10

## (undated) DEVICE — MODEL AT P65, P/N 701554-001KIT CONTENTS: HAND CONTROLLER, 3-WAY HIGH-PRESSURE STOPCOCK WITH ROTATING END AND PREMIUM HIGH-PRESSURE TUBING: Brand: ANGIOTOUCH® KIT

## (undated) DEVICE — DEV COMP RAD PRELUDESYNC 24CM

## (undated) DEVICE — TUBING, SUCTION, 1/4" X 20', STRAIGHT: Brand: MEDLINE INDUSTRIES, INC.

## (undated) DEVICE — POLYP TRAP: Brand: TRAPEASE®

## (undated) DEVICE — CATH DIAG EXPO M/ PK 5F FL4/FR4 PIG

## (undated) DEVICE — SNAR POLYP CAPTIFLX MICRO OVL 13MM 240CM

## (undated) DEVICE — SINGLE PORT MANIFOLD: Brand: NEPTUNE 2

## (undated) DEVICE — ENDOGATOR AUXILIARY WATER JET CONNECTOR: Brand: ENDOGATOR

## (undated) DEVICE — CATH DIAG EXPO .045 FL3  5F 100CM